# Patient Record
Sex: FEMALE | Race: WHITE | URBAN - METROPOLITAN AREA
[De-identification: names, ages, dates, MRNs, and addresses within clinical notes are randomized per-mention and may not be internally consistent; named-entity substitution may affect disease eponyms.]

---

## 2017-01-01 ENCOUNTER — TRANSFERRED RECORDS (OUTPATIENT)
Dept: HEALTH INFORMATION MANAGEMENT | Facility: CLINIC | Age: 77
End: 2017-01-01

## 2017-01-01 ENCOUNTER — ASSISTED LIVING VISIT (OUTPATIENT)
Dept: GERIATRICS | Facility: CLINIC | Age: 77
End: 2017-01-01
Payer: COMMERCIAL

## 2017-01-01 VITALS
BODY MASS INDEX: 30.12 KG/M2 | TEMPERATURE: 98.5 F | RESPIRATION RATE: 22 BRPM | SYSTOLIC BLOOD PRESSURE: 143 MMHG | HEART RATE: 94 BPM | WEIGHT: 154.2 LBS | DIASTOLIC BLOOD PRESSURE: 69 MMHG

## 2017-01-01 DIAGNOSIS — Z51.81 ENCOUNTER FOR THERAPEUTIC DRUG MONITORING: ICD-10-CM

## 2017-01-01 DIAGNOSIS — Z79.01 LONG TERM CURRENT USE OF ANTICOAGULANT THERAPY: ICD-10-CM

## 2017-01-01 DIAGNOSIS — I48.20 CHRONIC ATRIAL FIBRILLATION (H): Primary | ICD-10-CM

## 2017-01-01 RX ORDER — IPRATROPIUM BROMIDE AND ALBUTEROL SULFATE 2.5; .5 MG/3ML; MG/3ML
1 SOLUTION RESPIRATORY (INHALATION) EVERY 4 HOURS
COMMUNITY

## 2017-03-22 ENCOUNTER — TRANSFERRED RECORDS (OUTPATIENT)
Dept: HEALTH INFORMATION MANAGEMENT | Facility: CLINIC | Age: 77
End: 2017-03-22

## 2017-04-06 ENCOUNTER — ASSISTED LIVING VISIT (OUTPATIENT)
Dept: GERIATRICS | Facility: CLINIC | Age: 77
End: 2017-04-06
Payer: COMMERCIAL

## 2017-04-06 VITALS — WEIGHT: 158.6 LBS | BODY MASS INDEX: 28.55 KG/M2 | RESPIRATION RATE: 22 BRPM | TEMPERATURE: 95.8 F | HEART RATE: 82 BPM

## 2017-04-06 DIAGNOSIS — F33.0 MILD RECURRENT MAJOR DEPRESSION (H): ICD-10-CM

## 2017-04-06 DIAGNOSIS — I10 ESSENTIAL HYPERTENSION: ICD-10-CM

## 2017-04-06 DIAGNOSIS — F41.9 ANXIETY DISORDER, UNSPECIFIED TYPE: ICD-10-CM

## 2017-04-06 DIAGNOSIS — R73.9 HYPERGLYCEMIA: ICD-10-CM

## 2017-04-06 DIAGNOSIS — A04.72 C. DIFFICILE COLITIS: ICD-10-CM

## 2017-04-06 DIAGNOSIS — I50.9 CONGESTIVE HEART FAILURE, UNSPECIFIED CONGESTIVE HEART FAILURE CHRONICITY, UNSPECIFIED CONGESTIVE HEART FAILURE TYPE: ICD-10-CM

## 2017-04-06 DIAGNOSIS — I48.91 ATRIAL FIBRILLATION, UNSPECIFIED TYPE (H): ICD-10-CM

## 2017-04-06 DIAGNOSIS — K21.9 GASTROESOPHAGEAL REFLUX DISEASE, ESOPHAGITIS PRESENCE NOT SPECIFIED: ICD-10-CM

## 2017-04-06 DIAGNOSIS — E53.8 VITAMIN B12 DEFICIENCY: ICD-10-CM

## 2017-04-06 DIAGNOSIS — S22.000S THORACIC COMPRESSION FRACTURE, SEQUELA: ICD-10-CM

## 2017-04-06 DIAGNOSIS — H26.9 CATARACT: ICD-10-CM

## 2017-04-06 DIAGNOSIS — K22.719 BARRETT'S ESOPHAGUS WITH DYSPLASIA: ICD-10-CM

## 2017-04-06 DIAGNOSIS — N18.4 CHRONIC KIDNEY DISEASE, STAGE IV (SEVERE) (H): ICD-10-CM

## 2017-04-06 DIAGNOSIS — J44.9 COPD (CHRONIC OBSTRUCTIVE PULMONARY DISEASE) (H): Primary | ICD-10-CM

## 2017-04-06 DIAGNOSIS — H43.399 FLOATERS, UNSPECIFIED LATERALITY: ICD-10-CM

## 2017-04-06 PROBLEM — G25.3 MYOCLONUS: Status: ACTIVE | Noted: 2017-04-06

## 2017-04-06 PROBLEM — R44.1 VISUAL HALLUCINATIONS: Status: ACTIVE | Noted: 2017-04-06

## 2017-04-06 PROBLEM — J34.89 NASAL VESTIBULITIS: Status: ACTIVE | Noted: 2017-04-06

## 2017-04-06 PROBLEM — R68.2 DRY MOUTH: Status: ACTIVE | Noted: 2017-04-06

## 2017-04-06 RX ORDER — CARBOXYMETHYLCELLULOSE SODIUM 5 MG/ML
1-2 SOLUTION/ DROPS OPHTHALMIC EVERY 4 HOURS PRN
COMMUNITY

## 2017-04-06 RX ORDER — LIDOCAINE 50 MG/G
OINTMENT TOPICAL PRN
COMMUNITY
End: 2017-04-06

## 2017-04-06 RX ORDER — ALBUTEROL SULFATE 90 UG/1
2 AEROSOL, METERED RESPIRATORY (INHALATION) EVERY 6 HOURS PRN
COMMUNITY
End: 2017-08-21

## 2017-04-06 RX ORDER — LOPERAMIDE HCL 2 MG
2 CAPSULE ORAL DAILY
COMMUNITY
End: 2017-05-17

## 2017-04-06 RX ORDER — ALBUTEROL SULFATE 0.83 MG/ML
1 SOLUTION RESPIRATORY (INHALATION) 4 TIMES DAILY
COMMUNITY
End: 2017-05-17

## 2017-04-06 RX ORDER — ARFORMOTEROL TARTRATE 15 UG/2ML
15 SOLUTION RESPIRATORY (INHALATION) 2 TIMES DAILY
COMMUNITY
End: 2017-05-17

## 2017-04-06 RX ORDER — FLUTICASONE PROPIONATE 220 UG/1
2 AEROSOL, METERED RESPIRATORY (INHALATION) 2 TIMES DAILY
COMMUNITY
End: 2017-05-17

## 2017-04-06 RX ORDER — TIOTROPIUM BROMIDE 18 UG/1
18 CAPSULE ORAL; RESPIRATORY (INHALATION) DAILY
COMMUNITY
End: 2017-05-17

## 2017-04-06 RX ORDER — FLUTICASONE PROPIONATE 50 MCG
2 SPRAY, SUSPENSION (ML) NASAL DAILY
COMMUNITY

## 2017-04-06 NOTE — PROGRESS NOTES
[place] [facility]  PRIMARY CARE PROVIDER AND CLINIC:  Julio César [facility] [address]  / [place]*  Chief Complaint   Patient presents with     Establish Care       HPI:    Estefania Brandon is a 76 year old  (1940),admitted to the [facility] on 3/31/17, from [facility].  Admitted to this facility for  rehab, medical management and nursing care.     Current issues are:         COPD (chronic obstructive pulmonary disease) (H)  Congestive heart failure, unspecified congestive heart failure chronicity, unspecified congestive heart failure type (H)  Anxiety disorder, unspecified type  Atrial fibrillation, unspecified type (H)  Essential hypertension  Vitamin B12 deficiency  Gastroesophageal reflux disease, esophagitis presence not specified  Cleveland's esophagus with dysplasia  C. difficile colitis  Chronic kidney disease, stage IV (severe) (H)  Hyperglycemia  Thoracic compression fracture, sequela  Cataract  Floaters, unspecified laterality  Mild recurrent major depression (H)     Patient moved down to [facility] from [facility] in [place], [place], wanted to be closer to children, previously followed by Dr. Lennie Becerril at the [facility] in [place] with last appt on 3/22/17, is O2 dependent, moderate SOB with activity, end-stage COPD, previous smoker, BIPAP with sterile liquid humidifier, using walker to ambulate, Dr. Becerril completed F2F for [employer], SLUMs 21/30, mild cognitive impairment, independent med setup, has [facility] RN/PT/OT services, own INR machine to contact INRMD with results, weighs self daily, depression that may have been exacerbated by death of son to ETOH approximately 11 months ago, in recent history has fallen with Fx of rotator cuff and 2 ribs, overall is a complex patient with chronic condition, full code, physically weak, cognitively limited.     CODE  STATUS/ADVANCE DIRECTIVES DISCUSSION:   CPR/Full code   Patient's living condition: lives in an assisted living facility    ALLERGIES:Bacitracin; Cipro [benzyl alcohol]; Codeine; Neomycin; Plavix [clopidogrel bisulfate]; and Sulfa drugs  PAST MEDICAL HISTORY:  has a past medical history of Abnormal Pap smear; Atrial fibrillation (H) (2008); Carotid artery bruit; COPD (chronic obstructive pulmonary disease) (H) (1992); Essential hypertension, benign (1999); GERD (GASTROESOPHAGEAL REFLUX DISEASE) (11/16/2010); High cholesterol; Hypercoagulable state (H) (1993); Irritable bowel; and Osteoporoses.  PAST SURGICAL HISTORY:  has a past surgical history that includes EARDRUM REVISION; tonsillectomy & adenoidectomy (1950s); and EXCISION SKIN OF NOSE (8-11-10).  FAMILY HISTORY: family history includes Alcohol/Drug in her son; Alzheimer Disease in her brother; C.A.D. in her brother; CANCER in her brother and father; CEREBROVASCULAR DISEASE in her sister; Cancer - colorectal in her father; Cardiovascular in her mother; Connective Tissue Disorder in her daughter and son; Depression in her mother; Genitourinary Problems in her brother; Gynecology in her mother; HEART DISEASE in her brother; Hypertension in her brother, brother, mother, sister, sister, sister, and son; Lipids in her brother, sister, sister, and sister; Psychotic Disorder in her brother; Respiratory in her sister.  SOCIAL HISTORY:  reports that she has quit smoking. Her smoking use included Cigarettes. She has a 70.00 pack-year smoking history. She does not have any smokeless tobacco history on file. She reports that she does not drink alcohol or use illicit drugs.    Post Discharge Medication Reconciliation Status: discharge medications reconciled and changed, per note/orders (see AVS).  Current Outpatient Prescriptions   Medication Sig Dispense Refill     albuterol (2.5 MG/3ML) 0.083% neb solution Take 1 vial by nebulization 4 times daily       ALPRAZOLAM PO Take  0.25 mg by mouth daily       ALPRAZOLAM PO Take 0.125 mg by mouth At Bedtime       arformoterol (BROVANA) 15 MCG/2ML NEBU neb solution Take 15 mcg by nebulization 2 times daily       CALCIUM-VITAMIN D PO Take 1 tablet by mouth daily Dom 600 / Vit d 800       Citalopram Hydrobromide (CELEXA PO) Take 20 mg by mouth daily       Warfarin Sodium (COUMADIN PO) Take by mouth daily Take as directed per INR       CYANOCOBALAMIN PO Take 1,000 mcg by mouth daily       DULoxetine HCl (CYMBALTA PO) Take 40 mg by mouth daily       fluticasone (FLOVENT HFA) 220 MCG/ACT Inhaler Inhale 2 puffs into the lungs 2 times daily       fluticasone (FLONASE) 50 MCG/ACT spray Spray 1 spray into both nostrils daily as needed for rhinitis or allergies       Alum hydroxide-Mag carbonate (GAVISCON EXTRA STRENGTH) 160-105 MG CHEW Take 1 chew tab by mouth every 4 hours as needed       LISINOPRIL PO Take 2.5 mg by mouth Give 3 times per week       loperamide (IMODIUM) 2 MG capsule Take 2 mg by mouth every other day       Acetaminophen (TYLENOL PO) Take 1,000 mg by mouth every 6 hours as needed for mild pain or fever       METOLAZONE PO Take 2.5 mg by mouth Give 1 time weekly as needed for weight gain of 3 lbs or more       METOPROLOL SUCCINATE ER PO Take 12.5 mg by mouth daily        METOPROLOL SUCCINATE ER PO Take 12.5 mg by mouth daily as needed       NITROGLYCERIN SL Place 0.4 mg under the tongue every 5 minutes as needed for chest pain       OMEPRAZOLE PO Take 20 mg by mouth 2 times daily       POTASSIUM CHLORIDE ER PO Take 20 mEq by mouth daily       PREDNISONE PO Take 5 mg by mouth daily       carboxymethylcellulose (REFRESH PLUS) 0.5 % SOLN ophthalmic solution Place 1-2 drops into both eyes every 4 hours as needed for dry eyes       sodium chloride (OCEAN) 0.65 % nasal spray Spray 1 spray into both nostrils as needed for congestion       SIMVASTATIN PO Take 40 mg by mouth At Bedtime       tiotropium (SPIRIVA) 18 MCG capsule Inhale 18 mcg into  the lungs daily       TRAMADOL HCL PO Take 50 mg by mouth 2 times daily as needed for moderate to severe pain       albuterol (PROAIR HFA/PROVENTIL HFA/VENTOLIN HFA) 108 (90 BASE) MCG/ACT Inhaler Inhale 2 puffs into the lungs 4 times daily       VITAMIN D, CHOLECALCIFEROL, PO Take 2,000 Units by mouth daily       FUROSEMIDE 20 MG OR TABS TAKE ONE TABLET BY MOUTH TWICE DAILY 180 5     CLOTRIMAZOLE 10 MG MT LOZG 1 LOZENGE 4 TIMES DAILY     2       ROS:  10 point ROS of systems including Constitutional, Eyes, Respiratory, Cardiovascular, Gastroenterology, Genitourinary, Integumentary, Muscularskeletal, Psychiatric were all negative except for pertinent positives noted in my HPI.    Exam:  Pulse 82  Temp 95.8  F (35.4  C)  Resp 22  Wt 158 lb 9.6 oz (71.9 kg)  BMI 28.55 kg/m2  GENERAL APPEARANCE:  Alert, appears healthy, in slight/moderate distress due to SOB  ENT:  Mouth and posterior oropharynx normal, moist mucous membranes, normal hearing acuity  RESP:  lungs clear to auscultation , diminished breath sounds L lower base, using accessory muscles  CV:  Palpation and auscultation of heart done , regular rate and rhythm, no murmur, rub, or gallop, +2 pedal pulses, peripheral edema scant-1+ in bilateral lower legs  ABDOMEN:  no guarding or rebound, bowel sounds normal  M/S:   Gait and station abnormal weak, requires walker, SOB with any activity  SKIN:  Inspection of skin and subcutaneous tissue baseline  NEURO:   Examination of sensation by touch normal  PSYCH:  oriented to self/place, memory impaired , affect and mood normal, anxious    Lab/Diagnostic data:    None available at thi time.       ASSESSMENT/PLAN:  COPD (chronic obstructive pulmonary disease) (H)  Anxiety disorder, unspecified type  -previous smoker, lived in apartment with smokers present  -since arrival to [facility] improved air flow and decreased SOB  -[facility] PT/OT working with  -has BIPAP with sterile humidifier for  "sleeping  -alprazolam 0.25 Qam and 0.125mg Qpm for anxiety  -continues albuterol neb QID, brovana 15mcg neb BID, flovent HFA 220mcg BID, O2 3L, prednisone 5mg Qd, spiriva 18mcg Qd, ventolin 108mcg QID  -severe COPD, follow closely, infection and aspiration risk is high  -consider addition of alprazolam to TID if anxiousness continues  -check CBC on 4/12/17    Congestive heart failure, unspecified congestive heart failure chronicity, unspecified congestive heart failure type (H)  -taking lasix 20mg BID, recently decreased from 40mg BID due to dehydration  -taking lisinopril 2.5mg 3x/wk, unsure of why dosing is intermittent versus scheduled  -taking metoprolol succinate 12.5mg Qam WITH 12.5mg PRN for \"tremors/palpitations/myoclonic jerking\", consider this odd dosing also  -has nitro PRN, used only 1x in past 30 days  -continues K supplement 20mEq daily  -simvistatin 40mg Qhs  -above medications may require additional review to promote efficacy  -check BMP on 4/12/17    Mild recurrent major depression  -current regimen is historical: citalopram 20mg Qd and duloxetine 40mg Qpm  -PHQ-2 scored 0 at last Md appt  -unsure why on SSRI and SNRI concurrently  -continue until further notice, consider D/C of one in favor to increase other    Atrial fibrillation, unspecified type (H)  Essential hypertension  -long-term warfarin user  -has Accucheck INR machine in apt, checks Qweek and calls into clinic for dosing  -patient to contact [facility] staff with result Qwednesday for dosing instructions via [employer]  -INR today 1.8, was taking 3mg MWF, 4mg ROW  -start warfarin 4mg Qd  -recheck INR on Wed 4/12 with accucheck in home    Vitamin B12 deficiency  Anemia  -current historical regimen is Ca/VitaD 600/800 Qd, VItaB 1000mcg Qd  -continue at this juncture, check BMP/CBC and Beba D on 4/12    Gastroesophageal reflux disease, esophagitis presence not specified  Cleveland's esophagus with dysplasia  C. difficile colitis  -currently denies gut " "discomfort  -taking omeprazole 20mg BID  -consider reduction to Qd at next visit/med review    Chronic kidney disease, stage IV (severe) (H)  -historical Dx  -checking BMP 4/12    Hyperglycemia  -historical, no lab results available  -checking A1C on 4/12    Thoracic compression fracture, sequela  -has had previous falls, last in 2016 with compression Fx of vertebrae  -has acetaminophen PRN, tramadol 50mg BID PRN, and gabapentin 300mg BID  -D/C lidocaine patch, not using  -currently stable, states taking tramadol \"a few times\" monthly    Cataract  Floaters, unspecified laterality  -historical Dx  -continues refresh tears for dry eye only at this time  -consider f/u with ophthalmologist in future as indicated      Information reviewed:  Medications, vital signs, orders, nursing notes, problem list, hospital information. Facility care plan reviewed.   Total time spent with patient visit was 60 including patient visit, review of past records and caregivers. Greater than 50% of total time spent with counseling and coordinating care.    Electronically signed by:  IAN Maloney CNP              "

## 2017-04-11 ENCOUNTER — TRANSFERRED RECORDS (OUTPATIENT)
Dept: HEALTH INFORMATION MANAGEMENT | Facility: CLINIC | Age: 77
End: 2017-04-11

## 2017-04-12 ENCOUNTER — TRANSFERRED RECORDS (OUTPATIENT)
Dept: HEALTH INFORMATION MANAGEMENT | Facility: CLINIC | Age: 77
End: 2017-04-12

## 2017-04-12 ENCOUNTER — RECORDS - HEALTHEAST (OUTPATIENT)
Dept: LAB | Facility: CLINIC | Age: 77
End: 2017-04-12

## 2017-04-12 LAB
ANION GAP SERPL CALCULATED.3IONS-SCNC: 10 MMOL/L (ref 5–18)
BUN SERPL-MCNC: 20 MG/DL (ref 8–28)
CALCIUM SERPL-MCNC: 9.1 MG/DL (ref 8.5–10.5)
CHLORIDE SERPLBLD-SCNC: 98 MMOL/L (ref 98–107)
CO2 SERPL-SCNC: 34 MMOL/L (ref 22–31)
CREAT SERPL-MCNC: 0.86 MG/DL (ref 0.6–1.1)
DIFFERENTIAL: ABNORMAL
ERYTHROCYTE [DISTWIDTH] IN BLOOD BY AUTOMATED COUNT: 12.6 % (ref 11–14.5)
GFR SERPL CREATININE-BSD FRML MDRD: >60 ML/MIN/1.73M2
GLUCOSE SERPL-MCNC: 88 MG/DL (ref 70–125)
HCT VFR BLD AUTO: 38 % (ref 35–47)
HEMOGLOBIN: 12.2 G/DL (ref 12–16)
INR PPP: 2.14 (ref 0.9–1.1)
MCH RBC QN AUTO: 32.4 PG (ref 27–34)
MCHC RBC AUTO-ENTMCNC: 32.1 G/DL (ref 32–36)
MCV RBC AUTO: 101 FL (ref 80–100)
PLATELET # BLD AUTO: 165 THOU/UL (ref 140–440)
POTASSIUM SERPL-SCNC: 4.1 MMOL/L (ref 3.5–5)
RBC # BLD AUTO: 3.77 MILL/UL (ref 3.8–5.4)
SODIUM SERPL-SCNC: 142 MMOL/L (ref 136–145)
TSH SERPL-ACNC: 1.05 UIU/ML (ref 0.3–5)
WBC # BLD AUTO: 6 THOU/UL (ref 4–11)

## 2017-04-13 ENCOUNTER — NURSING HOME VISIT (OUTPATIENT)
Dept: GERIATRICS | Facility: CLINIC | Age: 77
End: 2017-04-13
Payer: COMMERCIAL

## 2017-04-13 VITALS
TEMPERATURE: 98.8 F | HEART RATE: 82 BPM | WEIGHT: 158 LBS | DIASTOLIC BLOOD PRESSURE: 86 MMHG | SYSTOLIC BLOOD PRESSURE: 122 MMHG | RESPIRATION RATE: 22 BRPM | BODY MASS INDEX: 28.44 KG/M2

## 2017-04-13 DIAGNOSIS — J20.9 ACUTE BRONCHITIS, UNSPECIFIED ORGANISM: ICD-10-CM

## 2017-04-13 DIAGNOSIS — S22.000S THORACIC COMPRESSION FRACTURE, SEQUELA: ICD-10-CM

## 2017-04-13 DIAGNOSIS — S22.32XA CLOSED FRACTURE OF RIB OF LEFT SIDE, INITIAL ENCOUNTER: ICD-10-CM

## 2017-04-13 DIAGNOSIS — Z51.81 ENCOUNTER FOR THERAPEUTIC DRUG MONITORING: ICD-10-CM

## 2017-04-13 DIAGNOSIS — I48.20 CHRONIC ATRIAL FIBRILLATION (H): ICD-10-CM

## 2017-04-13 DIAGNOSIS — N18.4 CHRONIC KIDNEY DISEASE, STAGE IV (SEVERE) (H): Primary | ICD-10-CM

## 2017-04-13 DIAGNOSIS — Z79.01 LONG TERM (CURRENT) USE OF ANTICOAGULANTS: ICD-10-CM

## 2017-04-13 DIAGNOSIS — J42 CHRONIC BRONCHITIS, UNSPECIFIED CHRONIC BRONCHITIS TYPE (H): ICD-10-CM

## 2017-04-13 PROBLEM — S22.39XA FRACTURE OF RIB: Status: ACTIVE | Noted: 2017-04-13

## 2017-04-13 LAB — HBA1C MFR BLD: 6 % (ref 4.2–6.1)

## 2017-04-13 PROCEDURE — 99207 ZZC CDG-CORRECTLY CODED, REVIEWED AND AGREE: CPT | Performed by: NURSE PRACTITIONER

## 2017-04-13 RX ORDER — TRAMADOL HYDROCHLORIDE 50 MG/1
50 TABLET ORAL 3 TIMES DAILY PRN
Qty: 28 TABLET | Refills: 5
Start: 2017-04-13 | End: 2017-04-20

## 2017-04-13 RX ORDER — AZITHROMYCIN 250 MG/1
TABLET, FILM COATED ORAL
Qty: 6 TABLET | Refills: 0
Start: 2017-04-13 | End: 2017-04-20

## 2017-04-13 NOTE — PROGRESS NOTES
[facility] [facility]    Chief Complaint   Patient presents with     Nursing Home Acute       HPI:    Estefania Brandon is a 76 year old  (1940), who is being seen today for an episodic care visit at [facility]. Today's concern is:       Acute bronchitis, unspecified organism  Fracture of rib  Thoracic compression fracture, sequela  COPD (chronic obstructive pulmonary disease) (H)  Chronic kidney disease, stage IV (severe) (H)  Chronic atrial fibrillation (H)  Encounter for therapeutic drug monitoring  Long term (current) use of anticoagulants     Patient seen today for INR recheck, medication follow up, lab review, and new-onset R flank pain, Labs 4/12/17 Na 142, K 4.1, Ca 9.1, creat 0.86, RBC 12.2, Plat 165, VitaD 72.7 (30-80), A1C 6.0, INR 2.14, TSH 1.05, CXR on 4/11/17 impression: mild L lower lung bronchitis, mild cardiomegaly, pulmonary granulomatous disease, pulmonary emphysema, pulmonary arterial hypertension, acute Fx of posterior L seventh rib with 1mm cortical offset, diffuse osteopenia, age-in determinant compression fractures of T6-9, positive for back and L flank pain, SOB with minimal activity, overall stable albeit chronic/complex conditions present.    ALLERGIES: Bacitracin; Cipro [benzyl alcohol]; Codeine; Neomycin; Plavix [clopidogrel bisulfate]; and Sulfa drugs  Past Medical, Surgical, Family and Social History reviewed and updated in [facility].    Current Outpatient Prescriptions   Medication Sig Dispense Refill     azithromycin (ZITHROMAX) 250 MG tablet Two tablets first day, then one tablet daily for four days. 6 tablet 0     traMADol (ULTRAM) 50 MG tablet Take 1 tablet (50 mg) by mouth 3 times daily as needed 28 tablet 5     albuterol (2.5 MG/3ML) 0.083% neb solution Take 1 vial by nebulization 4 times daily       ALPRAZOLAM PO Take 0.25 mg by mouth daily       ALPRAZOLAM PO Take 0.125 mg by mouth At Bedtime       arformoterol (BROVANA) 15 MCG/2ML NEBU neb solution Take 15  mcg by nebulization 2 times daily       Citalopram Hydrobromide (CELEXA PO) Take 20 mg by mouth daily       Warfarin Sodium (COUMADIN PO) Take by mouth daily Take as directed per INR       CYANOCOBALAMIN PO Take 1,000 mcg by mouth daily       DULoxetine HCl (CYMBALTA PO) Take 40 mg by mouth daily       fluticasone (FLOVENT HFA) 220 MCG/ACT Inhaler Inhale 2 puffs into the lungs 2 times daily       fluticasone (FLONASE) 50 MCG/ACT spray Spray 1 spray into both nostrils daily as needed for rhinitis or allergies       Alum hydroxide-Mag carbonate (GAVISCON EXTRA STRENGTH) 160-105 MG CHEW Take 1 chew tab by mouth every 4 hours as needed       LISINOPRIL PO Take 2.5 mg by mouth Give 3 times per week       loperamide (IMODIUM) 2 MG capsule Take 2 mg by mouth every other day       Acetaminophen (TYLENOL PO) Take 1,000 mg by mouth every 6 hours as needed for mild pain or fever       METOLAZONE PO Take 2.5 mg by mouth Give 1 time weekly as needed for weight gain of 3 lbs or more       METOPROLOL SUCCINATE ER PO Take 12.5 mg by mouth daily        METOPROLOL SUCCINATE ER PO Take 12.5 mg by mouth daily as needed       NITROGLYCERIN SL Place 0.4 mg under the tongue every 5 minutes as needed for chest pain       OMEPRAZOLE PO Take 20 mg by mouth 2 times daily       POTASSIUM CHLORIDE ER PO Take 20 mEq by mouth daily       PREDNISONE PO Take 5 mg by mouth daily       carboxymethylcellulose (REFRESH PLUS) 0.5 % SOLN ophthalmic solution Place 1-2 drops into both eyes every 4 hours as needed for dry eyes       sodium chloride (OCEAN) 0.65 % nasal spray Spray 1 spray into both nostrils as needed for congestion       SIMVASTATIN PO Take 40 mg by mouth At Bedtime       tiotropium (SPIRIVA) 18 MCG capsule Inhale 18 mcg into the lungs daily       albuterol (PROAIR HFA/PROVENTIL HFA/VENTOLIN HFA) 108 (90 BASE) MCG/ACT Inhaler Inhale 2 puffs into the lungs 4 times daily       FUROSEMIDE 20 MG OR TABS TAKE ONE TABLET BY MOUTH TWICE DAILY 180  5     CLOTRIMAZOLE 10 MG MT LOZG 1 LOZENGE 4 TIMES DAILY     2     Calcium Carb-Cholecalciferol (CALCIUM-VITAMIN D) 600-400 MG-UNIT TABS Take 1 tablet by mouth 2 times daily Dom 600 / Vit d 800  11     Medications reviewed:  Medications reconciled to facility chart and changes were made to reflect current medications as identified as above med list. Below are the changes that were made:   Medications stopped since last EPIC medication reconciliation:   There are no discontinued medications.    Medications started since last [facility] medication reconciliation:  No orders of the defined types were placed in this encounter.        REVIEW OF SYSTEMS:  10 point ROS of systems including Constitutional, Eyes, Respiratory, Cardiovascular, Gastroenterology, Genitourinary, Integumentary, Muscularskeletal, Psychiatric were all negative except for pertinent positives noted in my HPI.    Physical Exam:  /86  Pulse 82  Temp 98.8  F (37.1  C)  Resp 22  Wt 158 lb (71.7 kg)  BMI 28.44 kg/m2  GENERAL APPEARANCE: Alert, appears healthy, slightly anxious with L flank pain/SOB  ENT: Mouth and posterior oropharynx normal, moist mucous membranes  RESP: lungs clear to auscultation, diminished breath sounds bilateral bases  CV: Palpation and auscultation of heart done, regular rate and rhythm, edema scant in bilateral lower legs  ABDOMEN: no guarding or rebound, bowel sounds normal  M/S: Gait and station abnormal weak, requires walker, SOB with activity   SKIN: Inspection of skin and subcutaneous tissue baseline  NEURO: Examination of sensation by touch normal  PSYCH: oriented to self/place, memory impaired, affect and mood WNL, anxious    Recent Labs:   Lab Results   Component Value Date    WBC 6.0 04/12/2017    HGB 12.2 04/12/2017    HCT 38.0 04/12/2017     04/12/2017     04/12/2017    POTASSIUM 4.1 04/12/2017    CHLORIDE 98 04/12/2017    CO2 34 (A) 04/12/2017    BUN 20 04/12/2017    CR 0.86 04/12/2017    GLC 88  04/12/2017    SED 19 05/29/2007    AST 30 11/12/2010    ALT 38 11/12/2010    INR 2.14 (A) 04/12/2017       Assessment/Plan:  Acute bronchitis, unspecified organism  COPD (chronic obstructive pulmonary disease) (H)  -CXR on 4/11 probable infectious bronchiolitis  -patient afebrile, status WNL, anxious over potential Dx  -start Zpak stat, states having taken previously for similar issue  -continues O2 full time and pulmonary medications as prescribed    Fracture of rib  Thoracic compression fracture, sequela  -CXR 4/11 positive for L seventh rib Fx with 1mm cortical offset  -will increase tramadol to 50mg TID PRN for pain control  -patient understands to take tylenol with tramadol  -XR on 4/11 also positive for old compression fractures of T6-9  -consider decrease of tramadol back to BID after rib Fx healed      Chronic kidney disease, stage IV (severe) (H)  -historical Dx, may have been during hospitalization  -labs 4/12/17 creat/GFR were 0.86/>60  -no additional concerns at this time  -follow BMP's Q3-6 months or earlier if indicated    Chronic atrial fibrillation (H)  Encounter for therapeutic drug monitoring  Long term (current) use of anticoagulants  -previously on warfarin 3mg MWF and 4mg ROW  -INR on 4/6/17 was 1.8; take 4mg Qd  -INR on 4/12/17 was 2.14; take 4mg Qd  -recheck INR on Wed 4/26    Additional orders:  -VitaD on 4/12/17 was 72.7 (range 30-80)  -D/C VitaD supplement 2000iu/day  -increased Ca/VitaD 600/800 to BID    Electronically signed by  IAN Maloney CNP

## 2017-04-16 ENCOUNTER — TELEPHONE (OUTPATIENT)
Dept: GERIATRICS | Facility: CLINIC | Age: 77
End: 2017-04-16

## 2017-04-16 NOTE — TELEPHONE ENCOUNTER
Nursing report patient reports hallucinations, started last night and continued on today  Patient is aware of the hallucinations, has a Hx of anxiety, no new medications, she is being Tx for acute bronchitis as this time  Vitals are stable at this time  Orders:  Continue to monitor at this time, update NP on Monday

## 2017-04-20 ENCOUNTER — NURSING HOME VISIT (OUTPATIENT)
Dept: GERIATRICS | Facility: CLINIC | Age: 77
End: 2017-04-20
Payer: COMMERCIAL

## 2017-04-20 VITALS
WEIGHT: 158 LBS | TEMPERATURE: 98 F | RESPIRATION RATE: 22 BRPM | BODY MASS INDEX: 28.44 KG/M2 | SYSTOLIC BLOOD PRESSURE: 128 MMHG | DIASTOLIC BLOOD PRESSURE: 78 MMHG | OXYGEN SATURATION: 98 % | HEART RATE: 80 BPM

## 2017-04-20 DIAGNOSIS — F41.9 ANXIETY DISORDER, UNSPECIFIED TYPE: ICD-10-CM

## 2017-04-20 DIAGNOSIS — R44.3 HALLUCINATIONS: ICD-10-CM

## 2017-04-20 DIAGNOSIS — Z79.01 LONG TERM (CURRENT) USE OF ANTICOAGULANTS: ICD-10-CM

## 2017-04-20 DIAGNOSIS — K21.9 GASTROESOPHAGEAL REFLUX DISEASE WITHOUT ESOPHAGITIS: ICD-10-CM

## 2017-04-20 DIAGNOSIS — I48.20 CHRONIC ATRIAL FIBRILLATION (H): ICD-10-CM

## 2017-04-20 DIAGNOSIS — N18.4 CHRONIC KIDNEY DISEASE, STAGE IV (SEVERE) (H): ICD-10-CM

## 2017-04-20 DIAGNOSIS — Z51.81 ENCOUNTER FOR THERAPEUTIC DRUG MONITORING: ICD-10-CM

## 2017-04-20 DIAGNOSIS — I50.9 CONGESTIVE HEART FAILURE, UNSPECIFIED CONGESTIVE HEART FAILURE CHRONICITY, UNSPECIFIED CONGESTIVE HEART FAILURE TYPE: ICD-10-CM

## 2017-04-20 DIAGNOSIS — S22.32XA CLOSED FRACTURE OF RIB OF LEFT SIDE, INITIAL ENCOUNTER: Primary | ICD-10-CM

## 2017-04-20 DIAGNOSIS — I10 ESSENTIAL HYPERTENSION: ICD-10-CM

## 2017-04-20 PROCEDURE — 99207 ZZC CDG-CORRECTLY CODED, REVIEWED AND AGREE: CPT | Performed by: NURSE PRACTITIONER

## 2017-04-20 RX ORDER — FUROSEMIDE 20 MG
20 TABLET ORAL DAILY
Qty: 180 TABLET | Refills: 11
Start: 2017-04-20 | End: 2017-05-17

## 2017-04-20 NOTE — PROGRESS NOTES
"[facility]    Chief Complaint   Patient presents with     RECHECK       HPI:    Estefania Brandon is a 76 year old  (1940), who is being seen today for an episodic care visit at [facility] . Today's concern is:       Closed fracture of rib of left side, initial encounter  Hallucinations  Chronic kidney disease, stage IV (severe) (H)  Chronic atrial fibrillation (H)  Long term (current) use of anticoagulants  Encounter for therapeutic drug monitoring  Congestive heart failure, unspecified congestive heart failure chronicity, unspecified congestive heart failure type (H)  Anxiety disorder, unspecified type  Essential hypertension  Gastroesophageal reflux disease without esophagitis     Patient has a complex Hx/Dx, admitted to [facility] on 4/6/17, attempts of patient to be independent have proven to be difficult, found on 4/16 with hallucinations and medication box emptied with 2 days left in 7-day box, INR on 4/19 was 4.5, medications scattered on counter and room, considering probable mix of dementia, anxiety, and stress from recent transfer from home/apartment in [facility], today seen lying in bed, wants to rest, no acute concerns, slightly jittery/picking at skin, although denies hallucinations dose state \"I see 2 of you\", did eat this am and has partial water bottle at bedside, moderately confused but seems like self, was seen by PT after my visit and therapy stated slight weakness and declined therapy, no s/s rib pain as was case back on 4/13, of note facility recently took over medication administration and all meds are now locked up, considering non-compliance with taking medications from med box as setup, dementia, overall decline in both mental and cognitive status.     ALLERGIES: Bacitracin; Cipro [benzyl alcohol]; Codeine; Neomycin; Plavix [clopidogrel bisulfate]; and Sulfa drugs  Past Medical, Surgical, Family and Social History reviewed and updated in EPIC.    Current " Outpatient Prescriptions   Medication Sig Dispense Refill     TRAMADOL HCL PO Take 25 mg by mouth 3 times daily       furosemide (LASIX) 20 MG tablet Take 1 tablet (20 mg) by mouth daily 180 tablet 11     Alum hydroxide-Mag carbonate (GAVISCON EXTRA STRENGTH) 160-105 MG CHEW Take 1 chew tab by mouth 3 times daily (before meals) 240 tablet 11     ALPRAZOLAM PO Take 0.25 mg by mouth 2 times daily        DULoxetine HCl (CYMBALTA PO) Take 20 mg by mouth daily        SIMVASTATIN PO Take 20 mg by mouth At Bedtime        Calcium Carb-Cholecalciferol (CALCIUM-VITAMIN D) 600-400 MG-UNIT TABS Take 1 tablet by mouth 2 times daily Dom 600 / Vit d 800  11     albuterol (2.5 MG/3ML) 0.083% neb solution Take 1 vial by nebulization 4 times daily       arformoterol (BROVANA) 15 MCG/2ML NEBU neb solution Take 15 mcg by nebulization 2 times daily       Citalopram Hydrobromide (CELEXA PO) Take 20 mg by mouth daily       Warfarin Sodium (COUMADIN PO) Take by mouth daily Take as directed per INR       CYANOCOBALAMIN PO Take 1,000 mcg by mouth daily       fluticasone (FLOVENT HFA) 220 MCG/ACT Inhaler Inhale 2 puffs into the lungs 2 times daily       fluticasone (FLONASE) 50 MCG/ACT spray Spray 1 spray into both nostrils daily as needed for rhinitis or allergies       Alum hydroxide-Mag carbonate (GAVISCON EXTRA STRENGTH) 160-105 MG CHEW Take 1 chew tab by mouth every 4 hours as needed       LISINOPRIL PO Take 2.5 mg by mouth Give 3 times per week       loperamide (IMODIUM) 2 MG capsule Take 2 mg by mouth every other day       Acetaminophen (TYLENOL PO) Take 1,000 mg by mouth every 6 hours as needed for mild pain or fever       METOLAZONE PO Take 2.5 mg by mouth Give 1 time weekly as needed for weight gain of 3 lbs or more       METOPROLOL SUCCINATE ER PO Take 12.5 mg by mouth daily        METOPROLOL SUCCINATE ER PO Take 12.5 mg by mouth daily as needed       NITROGLYCERIN SL Place 0.4 mg under the tongue every 5 minutes as needed for  chest pain       OMEPRAZOLE PO Take 20 mg by mouth 2 times daily       POTASSIUM CHLORIDE ER PO Take 20 mEq by mouth daily       PREDNISONE PO Take 5 mg by mouth daily       carboxymethylcellulose (REFRESH PLUS) 0.5 % SOLN ophthalmic solution Place 1-2 drops into both eyes every 4 hours as needed for dry eyes       sodium chloride (OCEAN) 0.65 % nasal spray Spray 1 spray into both nostrils as needed for congestion       tiotropium (SPIRIVA) 18 MCG capsule Inhale 18 mcg into the lungs daily       albuterol (PROAIR HFA/PROVENTIL HFA/VENTOLIN HFA) 108 (90 BASE) MCG/ACT Inhaler Inhale 2 puffs into the lungs 4 times daily       CLOTRIMAZOLE 10 MG MT LOZG 1 LOZENGE 4 TIMES DAILY     2     [DISCONTINUED] FUROSEMIDE 20 MG OR TABS TAKE ONE TABLET BY MOUTH TWICE DAILY 180 5     Medications reviewed:  Medications reconciled to facility chart and changes were made to reflect current medications as identified as above med list. Below are the changes that were made:   Medications stopped since last EPIC medication reconciliation:   There are no discontinued medications.    Medications started since last [facility] medication reconciliation:  No orders of the defined types were placed in this encounter.        REVIEW OF SYSTEMS:  4 point ROS including Respiratory, CV, GI and , other than that noted in the HPI,  is negative    Physical Exam:  /78  Pulse 80  Temp 98  F (36.7  C)  Resp 22  Wt 158 lb (71.7 kg)  SpO2 98%  BMI 28.44 kg/m2  GENERAL APPEARANCE:  Alert, in no distress, anxious  ENT:  Mouth and posterior oropharynx normal, moist mucous membranes  RESP:  lungs clear to auscultation , no respiratory distress  CV:  Palpation and auscultation of heart done , regular rate and rhythm, no murmur, rub, or gallop, peripheral edema scant-1+ in bilateral lower legs  ABDOMEN:  no guarding or rebound, bowel sounds normal  M/S:   Gait and station abnormal requires walker, weak  SKIN:  Inspection of skin and subcutaneous  tissue baseline  NEURO:   Examination of sensation by touch normal  PSYCH:  oriented to self, insight and judgement impaired, memory impaired     Recent Labs:  Lab Results   Component Value Date    WBC 6.0 04/12/2017    HGB 12.2 04/12/2017    HCT 38.0 04/12/2017     04/12/2017     04/12/2017    POTASSIUM 4.1 04/12/2017    CHLORIDE 98 04/12/2017    CO2 34 (A) 04/12/2017    BUN 20 04/12/2017    CR 0.86 04/12/2017    GLC 88 04/12/2017    SED 19 05/29/2007    AST 30 11/12/2010    ALT 38 11/12/2010    INR 2.14 (A) 04/12/2017       Assessment/Plan:  Closed fracture of rib of left side, initial encounter  -CXR on 4/11 for rib Fx L flank  -decrease tramadol from 50 to 25mg TID scheduled  -continue gabapentin 300mg BID    Hallucinations  -considering onset due to non-compliance with medication regimen/med box setup, had taken many tabs not scheduled, unsure of which ones  -facility took over med administration on 4/19  -will follow closely with weights and VS daily x7d  -decrease alprazolam to 0.125mg BID  -decrease duloxetine from 40 to 20mg x7d then D/C; duplicate therapy with citalopram  -[facility] homecare to have mental health RN assess/treat for cognitive issues      Chronic kidney disease, stage IV (severe) (H)  -creat on 4/12/17 was 0.86  -consider BMP in 3-6 months or earlier if indicated    Chronic atrial fibrillation (H)  Long term (current) use of anticoagulants  Encounter for therapeutic drug monitoring  -INR on 4/19 was 4.5; hold warfarin  -recheck INR on Fri 4/21  -unsure if will have facility staff assist with home INR accucheck machine or change to lab draws  -of note, was previously taking mix of 3-4mg Qd      Congestive heart failure, unspecified congestive heart failure chronicity, unspecified congestive heart failure type (H)  Essential hypertension  -taking lasix 20mg BID, recently decreased from 40mg BID due to dehydration, will now decrease to 20mg Qd due to mild dehydration  -taking lisinopril  "2.5mg 3x/wk, unsure of why dosing is intermittent versus scheduled, consider Qd or DC  -taking metoprolol succinate 12.5mg Qam WITH 12.5mg PRN for \"tremors/palpitations/myoclonic jerking\", continue at this juncture  -continue metolazone PRN for weight gain 3# although considering D/C as weight may vary due to clothing/time/diet  -has nitro PRN, has used in past week for \"heart racing\", will have locked up now  -continues K supplement 20mEq daily  -simvistatin reduced to 20mg Qhs    Anxiety disorder, unspecified type  -previously taking alprazolam 0.25mg Qam, 0.125mg Qpm  -will change to 0.125mg BID due to cognitive issues and mental instability  -see above      Gastroesophageal reflux disease without esophagitis  -will change gaviscon to TID  -continue omeprazole BID  -consider weaning omeprazole as indicated    Orders:  1. Decrease simvastatin to 20 mg po qd  2. Hold Coumadin and recheck INR on 4/21/17  3. BP and weights check qd x 7 days and results to NP  4. Decrease tramadol to 25 mg po TID scheduled for rib pain  5. Change aprazolam to 0.25 mg po BID for anxiety  6. Change gaviscon to 1 tab po TID for GERD  7. Decrease duloxetine to 20 mg po qd x 7 days then discontinue  8. Decrease lasix to 20mg Qd  9.  home care OK for mental health RN to evaluate for depression and hallucinations    Above plan intended to reduce medication burden, improve overall cognitive and physical status, provide a constant for medication administration, and allow to be followed with improved ability to titrate medications and promote a healthy status.     Electronically signed by  IAN Maloney CNP                  "

## 2017-04-26 NOTE — PROGRESS NOTES
[facility]    Chief Complaint   Patient presents with     Mobility Issues     INR Followup       HPI:    Estefania Brandon is a 76 year old  (1940), who is being seen today for an episodic care visit at [facility] .     Today's concern is:     Acute low back pain without sciatica, unspecified back pain laterality  Thoracic compression fracture, sequela  Generalized muscle weakness  Personal history of fall  SOB (shortness of breath)  Impaired functional mobility, balance, gait, and endurance  Congestive heart failure, unspecified congestive heart failure chronicity, unspecified congestive heart failure type (H)  Chronic atrial fibrillation (H)  Encounter for therapeutic drug monitoring  Long term (current) use of anticoagulants     Patient presents today with above Dx, is weak, has a high level of limitations regarding mobility, unable to ambulate on own or use manual wheelchair due to severe SOB with associated CHF, on oxygen, numerous chronic conditions and comorbidities over the past year that are being treated medically albeit moderate efficacy, attempts at therapies and medication control have proven of limited efficacy, has walker and manual WC but unable to use effectively, impaired strength, ROM, balance, ability to transfer, mobility issues, unable to ambulate at all without severe SOB, multiple falls with most recent on 4/13/17 with rib Fx+, back pain positive with tramadol and gabapentin for relief, of note INR today 2.28, long-term warfarin user, overall status is weak, debilitated, moderate physical decline.     ALLERGIES: Bacitracin; Cipro [benzyl alcohol]; Codeine; Neomycin; Plavix [clopidogrel bisulfate]; and Sulfa drugs  Past Medical, Surgical, Family and Social History reviewed and updated in [facility].    Current Outpatient Prescriptions   Medication Sig Dispense Refill     Calcium Carb-Cholecalciferol (CALCIUM CARBONATE-VITAMIN D3 PO) Take 1 tablet by mouth 2 times  daily Dom 600 - Vit D 800       GABAPENTIN PO Take 300 mg by mouth 2 times daily       TRAMADOL HCL PO Take 25 mg by mouth 3 times daily as needed        furosemide (LASIX) 20 MG tablet Take 1 tablet (20 mg) by mouth daily 180 tablet 11     Alum hydroxide-Mag carbonate (GAVISCON EXTRA STRENGTH) 160-105 MG CHEW Take 1 chew tab by mouth 3 times daily (before meals) 240 tablet 11     albuterol (2.5 MG/3ML) 0.083% neb solution Take 1 vial by nebulization 4 times daily       ALPRAZOLAM PO Take 0.25 mg by mouth 2 times daily        arformoterol (BROVANA) 15 MCG/2ML NEBU neb solution Take 15 mcg by nebulization 2 times daily       Citalopram Hydrobromide (CELEXA PO) Take 20 mg by mouth daily       Warfarin Sodium (COUMADIN PO) Take by mouth daily Take as directed per INR       CYANOCOBALAMIN PO Take 1,000 mcg by mouth daily       DULoxetine HCl (CYMBALTA PO) Take 20 mg by mouth daily        fluticasone (FLOVENT HFA) 220 MCG/ACT Inhaler Inhale 2 puffs into the lungs 2 times daily       fluticasone (FLONASE) 50 MCG/ACT spray Spray 1 spray into both nostrils daily as needed for rhinitis or allergies       LISINOPRIL PO Take 2.5 mg by mouth Give 3 times per week       loperamide (IMODIUM) 2 MG capsule Take 2 mg by mouth daily        Acetaminophen (TYLENOL PO) Take 1,000 mg by mouth every 6 hours as needed for mild pain or fever       METOLAZONE PO Take 2.5 mg by mouth Give 1 time weekly as needed for weight gain of 3 lbs or more       METOPROLOL SUCCINATE ER PO Take 12.5 mg by mouth daily        METOPROLOL SUCCINATE ER PO Take 12.5 mg by mouth daily as needed       NITROGLYCERIN SL Place 0.4 mg under the tongue every 5 minutes as needed for chest pain       OMEPRAZOLE PO Take 20 mg by mouth 2 times daily       POTASSIUM CHLORIDE ER PO Take 20 mEq by mouth daily       PREDNISONE PO Take 5 mg by mouth daily       carboxymethylcellulose (REFRESH PLUS) 0.5 % SOLN ophthalmic solution Place 1-2 drops into both eyes every 4 hours as  needed for dry eyes       sodium chloride (OCEAN) 0.65 % nasal spray Spray 1 spray into both nostrils as needed for congestion       SIMVASTATIN PO Take 20 mg by mouth At Bedtime        tiotropium (SPIRIVA) 18 MCG capsule Inhale 18 mcg into the lungs daily       albuterol (PROAIR HFA/PROVENTIL HFA/VENTOLIN HFA) 108 (90 BASE) MCG/ACT Inhaler Inhale 2 puffs into the lungs 4 times daily       CLOTRIMAZOLE 10 MG MT LOZG 1 LOZENGE 4 TIMES DAILY     2     Medications reconciled to facility chart and changes were made to reflect current medications as identified as above med list. Below are the changes that were made:   Medications stopped since last EPIC medication reconciliation:   Medications Discontinued During This Encounter   Medication Reason     Calcium Carb-Cholecalciferol (CALCIUM-VITAMIN D) 600-400 MG-UNIT TABS Dose adjustment     Alum hydroxide-Mag carbonate (GAVISCON EXTRA STRENGTH) 160-105 MG CHEW Medication Reconciliation Clean Up       Medications started since last [facility] medication reconciliation:  Orders Placed This Encounter   Medications     Calcium Carb-Cholecalciferol (CALCIUM CARBONATE-VITAMIN D3 PO)     Sig: Take 1 tablet by mouth 2 times daily Dom 600 - Vit D 800     GABAPENTIN PO     Sig: Take 300 mg by mouth 2 times daily       REVIEW OF SYSTEMS:  10 point ROS of systems including Constitutional, Eyes, Respiratory, Cardiovascular, Gastroenterology, Genitourinary, Integumentary, Muscularskeletal, Psychiatric were all negative except for pertinent positives noted in my HPI.    Physical Exam:  /89  Pulse 81  Temp 97.9  F (36.6  C)  Resp 17  Wt 154 lb (69.9 kg)  BMI 27.72 kg/m2  GENERAL APPEARANCE:  Alert, oriented, cooperative  ENT:  Mouth and posterior oropharynx normal, moist mucous membranes, normal hearing acuity  RESP:  lungs clear to auscultation , no respiratory distress, diminished breath sounds bilateral bases  CV:  Palpation and auscultation of heart done , peripheral  edema 1+ in bilateral lower legs, rate-normal  ABDOMEN:  no guarding or rebound, bowel sounds normal  M/S:   Gait and station abnormal requires extensive assist, weak, limited by pain/SOB  SKIN:  Inspection of skin and subcutaneous tissue baseline  NEURO:   Examination of sensation by touch normal  PSYCH:  oriented to self/place, memory impaired , affect and mood normal    Recent Labs:     CBC RESULTS:   Recent Labs   Lab Test 04/12/17  11/12/10   1053   09/22/09   1326   WBC  6.0   --    --   6.7   RBC  3.77*   --    --   4.09   HGB  12.2  12.6   < >  12.6   HCT  38.0   --    --   36.6   MCV  101*   --    --   90   MCH  32.4   --    --   30.8   MCHC  32.1   --    --   34.3   RDW  12.6   --    --   13.0   PLT  165   --    --   144*    < > = values in this interval not displayed.       Last Basic Metabolic Panel:  Recent Labs   Lab Test 04/12/17  11/17/10   1319   10/26/10   1314  05/28/10   1343   NA  142   --    --   139  144   POTASSIUM  4.1   --    --   4.6  4.3   CHLORIDE  98   --    --   97  103   JUANITA  9.1   --    --    --   9.7   CO2  34*   --    --   33*  33*   BUN  20   --    --   17  18   CR  0.86   --    --   1.04  1.01   GLC  88  111*   < >   --   145*    < > = values in this interval not displayed.       Liver Function Studies -   Recent Labs   Lab Test  11/12/10   1053  03/17/10   1219  09/22/09   1326   ALBUMIN   --    --   3.7   AST  30  24   --    ALT  38  25   --        TSH   Date Value Ref Range Status   04/12/2017 1.05 0.30 - 5.00 uIU/mL Final   11/12/2010 1.05 0.4 - 5.0 mU/L Final       Lab Results   Component Value Date    INR 2.14 04/12/2017     Lab Results   Component Value Date    A1C 5.8 11/12/2010           Assessment/Plan:  Acute low back pain without sciatica, unspecified back pain laterality  Thoracic compression fracture, sequela  Generalized muscle weakness  Personal history of fall  SOB (shortness of breath)  Impaired functional mobility, balance, gait, and endurance  Congestive heart  failure, unspecified congestive heart failure chronicity, unspecified congestive heart failure type (H)  -had fall, CXR on 4/11 positive for L flank rib Fx  -taking lasix 20mg Qd for edema, also has metolazone for increased weight/fluid buildup  -K 20mEq daily effective dosing  -has Os 2-4L via NC, spiriva Qd, albuterol HFA QID, prednisone 5mg Qd, flovent BID  -continues gabapentin and tramadol for chronic back pain  -unable to use walker/manual wheelchair  -ordering power WC with pressure-relief cushion due to mobility limitations and falls risk      Chronic atrial fibrillation (H)  Encounter for therapeutic drug monitoring  Long term (current) use of anticoagulants  -INR 4/21 was 2.7, taking 4mg MWF, 3mg ROW  -INR 4/27 was 2.28, start 3mg T/Th, 4mg ROW  -recheck INR on Wed 5/17/17  -of note, switching to blood draw versus use of accucheck machine for consistency      Electronically signed by  IAN Maloney CNP          Documentation of Face-to-Face and Certification for Home Health Services     Patient: Estefania Brandon   YOB: 1940  MR Number: [id number]  Today's Date: 4/27/2017    Your provider has referred you to: OTHER PROVIDERS:    Extended Emergency Contact Information  Primary Emergency Contact: Brenda Burris  Address: [address] [address] [employer]  Mobile Phone: [phone]  Relation: Daughter  Secondary Emergency Contact: Kannan Brandon  Address: [address] [place] [address] [place]  Work Phone: [phone]  Mobile Phone: [phone]  Relation: Relative    REASON FOR REFERRAL: Mobility examination    ADDITIONAL SERVICES NEEDED: NA    OTHER PERTINENT INFORMATION: Patient was last seen by provider on 4/27/17 for    Acute low back pain without sciatica, unspecified back pain laterality  Thoracic compression fracture, sequela  Generalized muscle weakness  Personal history of fall  SOB (shortness of breath)  Impaired  functional mobility, balance, gait, and endurance  Congestive heart failure, unspecified congestive heart failure chronicity, unspecified congestive heart failure type (H)  Chronic atrial fibrillation (H)  Encounter for therapeutic drug monitoring  Long term (current) use of anticoagulants    Current Outpatient Prescriptions:  Calcium Carb-Cholecalciferol (CALCIUM CARBONATE-VITAMIN D3 PO), Take 1 tablet by mouth 2 times daily Dom 600 - Vit D 800, Disp: , Rfl:   GABAPENTIN PO, Take 300 mg by mouth 2 times daily, Disp: , Rfl:   TRAMADOL HCL PO, Take 25 mg by mouth 3 times daily as needed , Disp: , Rfl:   furosemide (LASIX) 20 MG tablet, Take 1 tablet (20 mg) by mouth daily, Disp: 180 tablet, Rfl: 11  Alum hydroxide-Mag carbonate (GAVISCON EXTRA STRENGTH) 160-105 MG CHEW, Take 1 chew tab by mouth 3 times daily (before meals), Disp: 240 tablet, Rfl: 11  albuterol (2.5 MG/3ML) 0.083% neb solution, Take 1 vial by nebulization 4 times daily, Disp: , Rfl:   ALPRAZOLAM PO, Take 0.25 mg by mouth 2 times daily , Disp: , Rfl:   arformoterol (BROVANA) 15 MCG/2ML NEBU neb solution, Take 15 mcg by nebulization 2 times daily, Disp: , Rfl:   Citalopram Hydrobromide (CELEXA PO), Take 20 mg by mouth daily, Disp: , Rfl:   Warfarin Sodium (COUMADIN PO), Take by mouth daily Take as directed per INR, Disp: , Rfl:   CYANOCOBALAMIN PO, Take 1,000 mcg by mouth daily, Disp: , Rfl:   DULoxetine HCl (CYMBALTA PO), Take 20 mg by mouth daily , Disp: , Rfl:   fluticasone (FLOVENT HFA) 220 MCG/ACT Inhaler, Inhale 2 puffs into the lungs 2 times daily, Disp: , Rfl:   fluticasone (FLONASE) 50 MCG/ACT spray, Spray 1 spray into both nostrils daily as needed for rhinitis or allergies, Disp: , Rfl:   LISINOPRIL PO, Take 2.5 mg by mouth Give 3 times per week, Disp: , Rfl:   loperamide (IMODIUM) 2 MG capsule, Take 2 mg by mouth daily , Disp: , Rfl:   Acetaminophen (TYLENOL PO), Take 1,000 mg by mouth every 6 hours as needed for mild pain or fever, Disp: ,  Rfl:   METOLAZONE PO, Take 2.5 mg by mouth Give 1 time weekly as needed for weight gain of 3 lbs or more, Disp: , Rfl:   METOPROLOL SUCCINATE ER PO, Take 12.5 mg by mouth daily , Disp: , Rfl:   METOPROLOL SUCCINATE ER PO, Take 12.5 mg by mouth daily as needed, Disp: , Rfl:   NITROGLYCERIN SL, Place 0.4 mg under the tongue every 5 minutes as needed for chest pain, Disp: , Rfl:   OMEPRAZOLE PO, Take 20 mg by mouth 2 times daily, Disp: , Rfl:   POTASSIUM CHLORIDE ER PO, Take 20 mEq by mouth daily, Disp: , Rfl:   PREDNISONE PO, Take 5 mg by mouth daily, Disp: , Rfl:   carboxymethylcellulose (REFRESH PLUS) 0.5 % SOLN ophthalmic solution, Place 1-2 drops into both eyes every 4 hours as needed for dry eyes, Disp: , Rfl:   sodium chloride (OCEAN) 0.65 % nasal spray, Spray 1 spray into both nostrils as needed for congestion, Disp: , Rfl:   SIMVASTATIN PO, Take 20 mg by mouth At Bedtime , Disp: , Rfl:   tiotropium (SPIRIVA) 18 MCG capsule, Inhale 18 mcg into the lungs daily, Disp: , Rfl:   albuterol (PROAIR HFA/PROVENTIL HFA/VENTOLIN HFA) 108 (90 BASE) MCG/ACT Inhaler, Inhale 2 puffs into the lungs 4 times daily, Disp: , Rfl:       Patient Active Problem List:     Atrial fibrillation (H)     Hypercoagulable state (H)     COPD (chronic obstructive pulmonary disease) (H)     Essential hypertension, benign     Skin cancer of nose     Chronic suppurative otitis media     Hypercholesteremia     Vitamin B12 deficiency disease     HYPERLIPIDEMIA LDL GOAL <130     Adjustment disorder with anxiety     Mild recurrent major depression (H)     GERD (gastroesophageal reflux disease)     Perforation of tympanic membrane     Impaired fasting glucose     Congestive heart failure, unspecified congestive heart failure chronicity, unspecified congestive heart failure type (H)     Anxiety disorder, unspecified type     Nasal vestibulitis     Dry mouth     Myoclonus     Atrial fibrillation, unspecified type (H)     Essential hypertension      Vitamin B12 deficiency     Gastroesophageal reflux disease, esophagitis presence not specified     Cleveland's esophagus with dysplasia     Chronic kidney disease, stage IV (severe) (H)     C. difficile colitis     Hyperglycemia     Thoracic compression fracture, sequela     Visual hallucinations     Cataract     Floaters, unspecified laterality     Encounter for therapeutic drug monitoring     Long term (current) use of anticoagulants     Fracture of rib      Documentation of Face to Face and Certification for DME:    I certify that patient, Estefania Brandon is under my care and that I, or a Nurse Practitioner or Physician's Assistant working with me, had a face-to-face encounter that meets the physician face-to-face encounter requirements with this patient on: 4/27/2017.    This encounter with the patient was in whole, or in part, for the following medical condition, which is the primary reason for DME: Impaired functional mobility, balance, gait and endurance.    I certify that, based on my findings, the following services are medically necessary DME: Power wheelchair and pressure relief cushion due to mobility limitations and falls risk.     My clinical findings support the need for the above services because: See HPI    Based on the above findings, I certify that this patient requires above DME.  The patient is under my care, and I have initiated the establishment of the plan of care.  This patient will be followed by a physician who will periodically review the plan of care.    Physician/Provider to provide follow up care: Julio César Maria certified Physician at time of discharge: Dr. Liliana Arreola  Electronic Physician Signature  Date: 4/27/2017

## 2017-04-27 ENCOUNTER — ASSISTED LIVING VISIT (OUTPATIENT)
Dept: GERIATRICS | Facility: CLINIC | Age: 77
End: 2017-04-27
Payer: COMMERCIAL

## 2017-04-27 VITALS
BODY MASS INDEX: 27.72 KG/M2 | HEART RATE: 81 BPM | WEIGHT: 154 LBS | DIASTOLIC BLOOD PRESSURE: 89 MMHG | RESPIRATION RATE: 17 BRPM | TEMPERATURE: 97.9 F | SYSTOLIC BLOOD PRESSURE: 166 MMHG

## 2017-04-27 DIAGNOSIS — R06.02 SOB (SHORTNESS OF BREATH): ICD-10-CM

## 2017-04-27 DIAGNOSIS — Z91.81 PERSONAL HISTORY OF FALL: ICD-10-CM

## 2017-04-27 DIAGNOSIS — S22.000S THORACIC COMPRESSION FRACTURE, SEQUELA: ICD-10-CM

## 2017-04-27 DIAGNOSIS — I50.9 CONGESTIVE HEART FAILURE, UNSPECIFIED CONGESTIVE HEART FAILURE CHRONICITY, UNSPECIFIED CONGESTIVE HEART FAILURE TYPE: Primary | ICD-10-CM

## 2017-04-27 DIAGNOSIS — Z51.81 ENCOUNTER FOR THERAPEUTIC DRUG MONITORING: ICD-10-CM

## 2017-04-27 DIAGNOSIS — M62.81 GENERALIZED MUSCLE WEAKNESS: ICD-10-CM

## 2017-04-27 DIAGNOSIS — I48.20 CHRONIC ATRIAL FIBRILLATION (H): ICD-10-CM

## 2017-04-27 DIAGNOSIS — Z74.09 IMPAIRED FUNCTIONAL MOBILITY, BALANCE, GAIT, AND ENDURANCE: ICD-10-CM

## 2017-04-27 DIAGNOSIS — Z79.01 LONG TERM (CURRENT) USE OF ANTICOAGULANTS: ICD-10-CM

## 2017-04-27 DIAGNOSIS — M54.50 ACUTE LOW BACK PAIN WITHOUT SCIATICA, UNSPECIFIED BACK PAIN LATERALITY: ICD-10-CM

## 2017-05-07 ENCOUNTER — TELEPHONE (OUTPATIENT)
Dept: GERIATRICS | Facility: CLINIC | Age: 77
End: 2017-05-07

## 2017-05-08 DIAGNOSIS — E53.8 VITAMIN B12 DEFICIENCY (NON ANEMIC): ICD-10-CM

## 2017-05-08 DIAGNOSIS — I10 BENIGN ESSENTIAL HYPERTENSION: Primary | ICD-10-CM

## 2017-05-09 ENCOUNTER — CARE COORDINATION (OUTPATIENT)
Dept: GERIATRIC MEDICINE | Facility: CLINIC | Age: 77
End: 2017-05-09

## 2017-05-09 NOTE — PROGRESS NOTES
5/9/17 CM received member as new [facility] client. CM called members home phone and spoke with member introducing herself as members new  for the [facility] program. She agreed to set up a home visit but would like her daughter Brenda [phone] to also be there. She states Brenda usually comes down on Fridays. She wanted CM to call Brenda to see if this Friday would be ok.  MILTON called Brenda and left a VM letting her know CM is members new  for the [facility] program and her mom would like to meet with MILTON and Brenda to be there. [facility].  Chantelle Cardona RN BA N  [facility] Case Management  [phone]

## 2017-05-09 NOTE — PROGRESS NOTES
5/9/17 MILTON spoke with daughter Brenda and scheduled the home visit for Friday May 12 at 11am. MILTON also called [facility]  Living nurse Rylie and left a VM letting her know CM will be doing a home visit and will stop by the nursing office for members medication list and services. Also MILTON left her name and number for Rylie to call if she has any service changes needed to the CL tool.  Chantelle Cardona RN BA N  [facility] Case Management  [phone]

## 2017-05-10 RX ORDER — POTASSIUM CHLORIDE 1500 MG/1
20 TABLET, EXTENDED RELEASE ORAL DAILY
Qty: 31 TABLET | Status: SHIPPED | OUTPATIENT
Start: 2017-05-10

## 2017-05-12 ENCOUNTER — CARE COORDINATION (OUTPATIENT)
Dept: GERIATRIC MEDICINE | Facility: CLINIC | Age: 77
End: 2017-05-12

## 2017-05-12 NOTE — PROGRESS NOTES
Home visit 1:1 [facility] Risk Assessment completed on: 5/12/17 with member and her daughter Brenda at the assisted living facility.  [facility]  Member PHQ9 score:  19, member is on antidepressants and has been seeing [facility] mental health nurse Marium. CM will send the PHQ9 results to PCP  Medication management/medications reviewed: AL staff set up and administer medications  See EMR for a list of client diagnoses and medications.  See LTCC for further detailed information.  Member or family will call with any further questions or concerns.  CM will f/u with telephone call scheduled in 6 months and as needed.  See members care plan for further assessment needs, goals, interventions and services.    Chantelle Cardona RN PHN  [facility] Case Management  [phone]

## 2017-05-13 ASSESSMENT — PATIENT HEALTH QUESTIONNAIRE - PHQ9: SUM OF ALL RESPONSES TO PHQ QUESTIONS 1-9: 19

## 2017-05-15 ENCOUNTER — CARE COORDINATION (OUTPATIENT)
Dept: GERIATRIC MEDICINE | Facility: CLINIC | Age: 77
End: 2017-05-15

## 2017-05-15 DIAGNOSIS — K21.9 GASTROESOPHAGEAL REFLUX DISEASE WITHOUT ESOPHAGITIS: Primary | ICD-10-CM

## 2017-05-15 NOTE — PROGRESS NOTES
5/15/17 CM faxed CL tool to [facility] and will mail a copy to member with her care plan. CL tool sent to [facility] to download to [facility]. CM sent information to [facility] to order poise pads.  Chantelle Cardona RN BA N  [facility] Case Management  [phone]

## 2017-05-15 NOTE — PROGRESS NOTES
Faxed copy to AL facility, uploaded into [employer] and emailed copy to Magdalena VELAZCO for auth.      Estefany Alex  Case Management Specialist  [facility]   [phone]

## 2017-05-16 NOTE — PROGRESS NOTES
[facility]  Chief Complaint   Patient presents with     Annual Comprehensive Nursing Home       HPI:    Estefania Brandon is a 76 year old  (1940), who is being seen today for an annual comprehensive visit at [facility] .     Estefania is a complex patient with high level of care needs both mentally and physically,     Today's concerns are:  Congestive heart failure, unspecified congestive heart failure chronicity, unspecified congestive heart failure type (H)  Weight 4/26/17; 154.6lb    Atrial fibrillation, unspecified type (H)  Long term (current) use of anticoagulants  Lab Results   Component Value Date    INR 2.14 04/12/2017     Chronic kidney disease, stage IV (severe) (H)  Creatinine   Date Value Ref Range Status   04/12/2017 0.86 0.60 - 1.10 mg/dL Final       Essential hypertension  Based on JNC-8 goals,  patients age of 76 year old, presence of diabetes or CKD, and goals of care goal BP is  <140/90 mm Hg. patient is stable with current plan of care and routine assessment..  BP readings range:  120/77  188/71  166/89  134/67  174/82  167/98    Mild recurrent major depression (H)  Depression screen done: PHQ-9 Given screen score and clinical assessment patient is stable on current plan of care/interventions of celexa 20mg Qd and on going assessment will continue.      Anxiety disorder, unspecified type  Patient has moderate anxiety, probable relation to personality and SOB, recently on 5/7 change alprazolam to 0.25mg TID plus Q4h PRN, moderate efficacy.    Chronic bronchitis, unspecified chronic bronchitis type (H)  Pulmonary medications reviewed previously and was setup with nebs to improve efficacy, had exacerbation and prednisone was increased to 20mg Qd on 5/7/17, patient adamant about needing increased dose, was instructed on potential wean and vehemently declines lower dosing at this time.    Gastroesophageal reflux disease, esophagitis presence not specified  Current  regimen Omeprazole 20mg po BID.        The health plan new enrollment has happened. I have reviewed the  MDS, the preventative needs,  and facility care plan. The level of care is appropriate. I have reviewed the code status/advanced directives.       ALLERGIES: Bacitracin; Cipro [benzyl alcohol]; Codeine; Neomycin; Plavix [clopidogrel bisulfate]; and Sulfa drugs  PROBLEM LIST:  Patient Active Problem List   Diagnosis     Atrial fibrillation (H)     Hypercoagulable state (H)     COPD (chronic obstructive pulmonary disease) (H)     Essential hypertension, benign     Skin cancer of nose     Chronic suppurative otitis media     Hypercholesteremia     Vitamin B12 deficiency disease     HYPERLIPIDEMIA LDL GOAL <130     Adjustment disorder with anxiety     Mild recurrent major depression (H)     GERD (gastroesophageal reflux disease)     Perforation of tympanic membrane     Impaired fasting glucose     Congestive heart failure, unspecified congestive heart failure chronicity, unspecified congestive heart failure type (H)     Anxiety disorder, unspecified type     Nasal vestibulitis     Dry mouth     Myoclonus     Atrial fibrillation, unspecified type (H)     Essential hypertension     Vitamin B12 deficiency     Gastroesophageal reflux disease, esophagitis presence not specified     Cleveland's esophagus with dysplasia     Chronic kidney disease, stage IV (severe) (H)     C. difficile colitis     Hyperglycemia     Thoracic compression fracture, sequela     Visual hallucinations     Cataract     Floaters, unspecified laterality     Encounter for therapeutic drug monitoring     Long term (current) use of anticoagulants     Fracture of rib     Health Care Home     Advanced directives, counseling/discussion     PAST MEDICAL HISTORY:  has a past medical history of Abnormal Pap smear; Atrial fibrillation (H) (2008); Carotid artery bruit; COPD (chronic obstructive pulmonary disease) (H) (1992); Essential hypertension, benign  (1999); GERD (GASTROESOPHAGEAL REFLUX DISEASE) (11/16/2010); High cholesterol; Hypercoagulable state (H) (1993); Irritable bowel; and Osteoporoses.  PAST SURGICAL HISTORY:  has a past surgical history that includes EARDRUM REVISION; tonsillectomy & adenoidectomy (1950s); and EXCISION SKIN OF NOSE (8-11-10).  FAMILY HISTORY: family history includes Alcohol/Drug in her son; Alzheimer Disease in her brother; C.A.D. in her brother; CANCER in her brother and father; CEREBROVASCULAR DISEASE in her sister; Cancer - colorectal in her father; Cardiovascular in her mother; Connective Tissue Disorder in her daughter and son; Depression in her mother; Genitourinary Problems in her brother; Gynecology in her mother; HEART DISEASE in her brother; Hypertension in her brother, brother, mother, sister, sister, sister, and son; Lipids in her brother, sister, sister, and sister; Psychotic Disorder in her brother; Respiratory in her sister.  SOCIAL HISTORY:  reports that she has quit smoking. Her smoking use included Cigarettes. She has a 70.00 pack-year smoking history. She does not have any smokeless tobacco history on file. She reports that she does not drink alcohol or use illicit drugs.  IMMUNIZATIONS:  Most Recent Immunizations   Administered Date(s) Administered     Influenza (H1N1) 01/08/2010     Influenza (High Dose) 3 valent vaccine 11/05/2010     Influenza (IIV3) 10/02/2009     Pneumococcal 23 valent 12/01/2005     TD (ADULT, 7+) 07/28/2003     Above immunizations pulled from [facility]. [facility] and facility records also reconciled. Outstanding information sent to  to update [facility].  Future immunizations needed:  yearly influenza per facility protocol  MEDICATIONS:  Current Outpatient Prescriptions   Medication Sig Dispense Refill     TRAMADOL HCL PO Take 25 mg by mouth 3 times daily       predniSONE (DELTASONE) 20 MG tablet Take 0.5 tablets (10 mg) by mouth daily (take 20mg 5/18, then 15mg x10 days,  then 10mg Qd 31 tablet 11     budesonide (PULMICORT) 0.25 MG/2ML neb solution Take 0.25 mg by nebulization 2 times daily       ipratropium - albuterol 0.5 mg/2.5 mg/3 mL (DUONEB) 0.5-2.5 (3) MG/3ML neb solution Take 1 vial by nebulization 4 times daily Also give BID PRN       omeprazole (PRILOSEC) 20 MG CR capsule Take 1 capsule (20 mg) by mouth 2 times daily 62 capsule PRN     Potassium Chloride ER 20 MEQ TBCR Take 1 tablet (20 mEq) by mouth daily 31 tablet PRN     cyanocobalamin 1000 MCG TABS Take 1,000 mcg by mouth daily 31 tablet PRN     cyanocobalamin 1000 MCG TABS Take 1,000 mcg by mouth daily 31 tablet PRN     Calcium Carb-Cholecalciferol (CALCIUM CARBONATE-VITAMIN D3 PO) Take 1 tablet by mouth 2 times daily Dom 600 - Vit D 800       TRAMADOL HCL PO Take 25 mg by mouth 3 times daily as needed        ALPRAZOLAM PO Take 0.25 mg by mouth 3 times daily Also give 0.25mg po q4h prn       Warfarin Sodium (COUMADIN PO) Take by mouth daily Take as directed per INR       fluticasone (FLONASE) 50 MCG/ACT spray Spray 1 spray into both nostrils daily        Acetaminophen (TYLENOL PO) Take 1,000 mg by mouth every 6 hours as needed for mild pain or fever       METOLAZONE PO Take 2.5 mg by mouth Give 1 time weekly as needed for weight gain of 3 lbs or more       METOPROLOL SUCCINATE ER PO Take 12.5 mg by mouth daily as needed       NITROGLYCERIN SL Place 0.4 mg under the tongue every 5 minutes as needed for chest pain       carboxymethylcellulose (REFRESH PLUS) 0.5 % SOLN ophthalmic solution Place 1-2 drops into both eyes every 4 hours as needed for dry eyes       sodium chloride (OCEAN) 0.65 % nasal spray Spray 1 spray into both nostrils as needed for congestion       albuterol (PROAIR HFA/PROVENTIL HFA/VENTOLIN HFA) 108 (90 BASE) MCG/ACT Inhaler Inhale 2 puffs into the lungs every 6 hours as needed        furosemide (LASIX) 20 MG tablet Take 1 tablet (20 mg) by mouth daily 31 tablet PRN     Calcium Carb-Cholecalciferol  (CALCIUM 600/VITAMIN D3) 600-800 MG-UNIT TABS Take 1 tablet by mouth 2 times daily 62 tablet PRN     citalopram (CELEXA) 20 MG tablet Take 1 tablet (20 mg) by mouth daily 31 tablet PRN     gabapentin (NEURONTIN) 300 MG capsule Take 1 capsule (300 mg) by mouth 2 times daily 62 capsule PRN     Alum hydroxide-Mag carbonate (GAVISCON EXTRA STRENGTH) 160-105 MG CHEW Take 1 chew tab by mouth 3 times daily AND MAY CHEW 2 TABS EVERY 4 HOURS AS NEEDED 240 tablet PRN     lisinopril (PRINIVIL/ZESTRIL) 2.5 MG tablet Take 1 tablet (2.5 mg) by mouth daily ON Monday, Wednesday, AND Friday 15 tablet PRN     loperamide (IMODIUM) 2 MG capsule Take 1 capsule (2 mg) by mouth daily 31 capsule PRN     metoprolol (TOPROL-XL) 25 MG 24 hr tablet Take 0.5 tablets (12.5 mg) by mouth daily AND MAY TAKE 12.5MG ONCE DAILY AS NEEDED 31 tablet PRN     simvastatin (ZOCOR) 20 MG tablet Take 1 tablet (20 mg) by mouth daily 31 tablet PRN     DULoxetine HCl (CYMBALTA PO) Take 20 mg by mouth daily          Case Management:  I have reviewed the Assisted Living care plan, current immunizations and preventive care/cancer screening.. Future cancer screening is not clinically indicated secondary to age/goals of care.   Patient's desire to return to the community is present, but is not able due to care needs .    Advance Directive Discussion:    I reviewed the current advanced directives as reflected in [facility] and the facility chart.  I reviewed the POLST, resigned, dated and sent to [facility].  I did review the advance directives with the resident.     Team Discussion:  I communicated with the appropriate disciplines involved with the Plan of Care:   Nursing    Patient Goal:  Patient's goal is full cares and interventions.    Information reviewed:  Medications, vital signs, orders, and nursing notes.    ROS:  4 point ROS including Respiratory, CV, GI and , other than that noted in the HPI,  is negative    Exam:  /77  Pulse 91  Temp 98.3  F  (36.8  C)  Resp 17  Ht 5' (1.524 m)  Wt 154 lb 9.6 oz (70.1 kg)  BMI 30.19 kg/m2  GENERAL APPEARANCE: Alert, appears healthy, slightly anxious with L flank pain/SOB complaints  ENT: Mouth and posterior oropharynx normal, moist mucous membranes  RESP: lungs clear to auscultation, diminished breath sounds bilateral bases, limited air movement  CV: Palpation and auscultation of heart done, regular rate and rhythm, edema scant in bilateral lower legs  ABDOMEN: no guarding or rebound, bowel sounds normal  M/S: Gait and station abnormal weak, requires walker, SOB with activity   SKIN: Inspection of skin and subcutaneous tissue baseline  NEURO: Examination of sensation by touch normal, intact  PSYCH: oriented to self/place, memory impaired, affect and mood WNL, anxious       Lab/Diagnostic data:     CBC RESULTS:   Recent Labs   Lab Test 04/12/17  11/12/10   1053   09/22/09   1326   WBC  6.0   --    --   6.7   RBC  3.77*   --    --   4.09   HGB  12.2  12.6   < >  12.6   HCT  38.0   --    --   36.6   MCV  101*   --    --   90   MCH  32.4   --    --   30.8   MCHC  32.1   --    --   34.3   RDW  12.6   --    --   13.0   PLT  165   --    --   144*    < > = values in this interval not displayed.       Last Basic Metabolic Panel:  Recent Labs   Lab Test 04/12/17  11/17/10   1319   10/26/10   1314  05/28/10   1343   NA  142   --    --   139  144   POTASSIUM  4.1   --    --   4.6  4.3   CHLORIDE  98   --    --   97  103   JUANITA  9.1   --    --    --   9.7   CO2  34*   --    --   33*  33*   BUN  20   --    --   17  18   CR  0.86   --    --   1.04  1.01   GLC  88  111*   < >   --   145*    < > = values in this interval not displayed.       Liver Function Studies -   Recent Labs   Lab Test  11/12/10   1053  03/17/10   1219  09/22/09   1326   ALBUMIN   --    --   3.7   AST  30  24   --    ALT  38  25   --        TSH   Date Value Ref Range Status   04/12/2017 1.05 0.30 - 5.00 uIU/mL Final   11/12/2010 1.05 0.4 - 5.0 mU/L Final        Lab Results   Component Value Date    INR 2.14 04/12/2017    INR 3.1 11/17/2010       Lab Results   Component Value Date    A1C 5.8 11/12/2010             ASSESSMENT/PLAN  Congestive heart failure, unspecified congestive heart failure chronicity, unspecified congestive heart failure type (H)  Chronic bronchitis, unspecified chronic bronchitis type (H)  -pharm consulted regarding pulmonary medications with following changes in April 2017:  D/C brovana, spiriva, flovent, and albuterol neb, START duoneb QID and PRN, budesonide neb BID, and continue albuterol HFA PRN.  -patient has long history of SOB, COPD exacerbations, recent Tx in April for bronchiolitis, on 5/7 prednisone increased from 2.5mg Qd to 20mg Qd to reduce SOB  -wean prednisone from 20 to 15mg x10 days then 10mg Qd  -is not using CPAP, instructed to contact equipment provider to have machine and face mask updated and adjusted  -will change fluticasone nasal spray to BID scheduled     Atrial fibrillation, unspecified type (H)  Long term (current) use of anticoagulants  -INR was 3.62  -warfarin was 3mg T/Th, 4mg ROW  -warfarin new dose: 3mg Qd  -recheck INR on Wed 5/31    Chronic kidney disease, stage IV (severe) (H)  -Creat on 4/12/17 was 0.86  -consider lab recheck as indicated    Essential hypertension  -SBP's have been in the 130-150 range  -continues metoprolol succinate 12.5mg Qd, lisinopril 2.5mg MWF, furosemide 20mg Qd  -change lisinopril to 2.5mg Qd in future if SBP's remain >130 or 140  -of note, intermittent anxiety may have role in BP readnings    Mild recurrent major depression (H)  -PHQ-9 on 5/12/17 was 19, elevated  -no s/s depression today, patient very displeased that family is not there every day and facility staff/caregiveres cannot spend more time with  -taking citalopram 20mg, no consideration to increase at this time  -followed by [facility] RN for mental health status in-person weekly, [facility] to have RN out weekly to check on status  soon    Anxiety disorder, unspecified type  -anxiety intermittently high  -alprazolam (historical med, tolerates well) increased on 5/7/17 to 0.25mg TID and 0.25mg Q4h PRN  -per patient provides adequate anxiety control  -will increase tramadol to 25mg TID scheduled and keep the TID PRN    Gastroesophageal reflux disease, esophagitis presence not specified  -recently increased gaviscon to TID  -continue omeprazole BID  -consider weaning omeprazole if indicated       Electronically signed by:  IAN Maloney CNP  [facility]

## 2017-05-17 VITALS
TEMPERATURE: 98.3 F | SYSTOLIC BLOOD PRESSURE: 120 MMHG | BODY MASS INDEX: 30.35 KG/M2 | WEIGHT: 154.6 LBS | HEIGHT: 60 IN | HEART RATE: 91 BPM | RESPIRATION RATE: 17 BRPM | DIASTOLIC BLOOD PRESSURE: 77 MMHG

## 2017-05-17 DIAGNOSIS — E78.2 MIXED HYPERLIPIDEMIA: Primary | ICD-10-CM

## 2017-05-17 DIAGNOSIS — J44.9 CHRONIC OBSTRUCTIVE PULMONARY DISEASE, UNSPECIFIED COPD TYPE (H): ICD-10-CM

## 2017-05-17 DIAGNOSIS — I10 ESSENTIAL HYPERTENSION: ICD-10-CM

## 2017-05-17 DIAGNOSIS — K21.9 GASTROESOPHAGEAL REFLUX DISEASE WITHOUT ESOPHAGITIS: ICD-10-CM

## 2017-05-17 DIAGNOSIS — F32.A DEPRESSION, UNSPECIFIED DEPRESSION TYPE: ICD-10-CM

## 2017-05-17 DIAGNOSIS — M81.0 OSTEOPOROSIS: ICD-10-CM

## 2017-05-17 DIAGNOSIS — G62.9 NEUROPATHY: ICD-10-CM

## 2017-05-17 DIAGNOSIS — R19.7 DIARRHEA, UNSPECIFIED TYPE: ICD-10-CM

## 2017-05-17 RX ORDER — IPRATROPIUM BROMIDE AND ALBUTEROL SULFATE 2.5; .5 MG/3ML; MG/3ML
1 SOLUTION RESPIRATORY (INHALATION) 4 TIMES DAILY
COMMUNITY
End: 2017-06-01

## 2017-05-17 RX ORDER — BUDESONIDE 0.25 MG/2ML
0.25 INHALANT ORAL 2 TIMES DAILY
COMMUNITY
End: 2017-06-29

## 2017-05-18 ENCOUNTER — ASSISTED LIVING VISIT (OUTPATIENT)
Dept: GERIATRICS | Facility: CLINIC | Age: 77
End: 2017-05-18
Payer: COMMERCIAL

## 2017-05-18 DIAGNOSIS — N18.4 CHRONIC KIDNEY DISEASE, STAGE IV (SEVERE) (H): ICD-10-CM

## 2017-05-18 DIAGNOSIS — K21.9 GASTROESOPHAGEAL REFLUX DISEASE, ESOPHAGITIS PRESENCE NOT SPECIFIED: ICD-10-CM

## 2017-05-18 DIAGNOSIS — F41.9 ANXIETY DISORDER, UNSPECIFIED TYPE: ICD-10-CM

## 2017-05-18 DIAGNOSIS — F33.0 MILD RECURRENT MAJOR DEPRESSION (H): ICD-10-CM

## 2017-05-18 DIAGNOSIS — I48.91 ATRIAL FIBRILLATION, UNSPECIFIED TYPE (H): ICD-10-CM

## 2017-05-18 DIAGNOSIS — I50.9 CONGESTIVE HEART FAILURE, UNSPECIFIED CONGESTIVE HEART FAILURE CHRONICITY, UNSPECIFIED CONGESTIVE HEART FAILURE TYPE: Primary | ICD-10-CM

## 2017-05-18 DIAGNOSIS — I10 ESSENTIAL HYPERTENSION: ICD-10-CM

## 2017-05-18 DIAGNOSIS — Z79.01 LONG TERM (CURRENT) USE OF ANTICOAGULANTS: ICD-10-CM

## 2017-05-18 DIAGNOSIS — J42 CHRONIC BRONCHITIS, UNSPECIFIED CHRONIC BRONCHITIS TYPE (H): ICD-10-CM

## 2017-05-18 DIAGNOSIS — J44.9 CHRONIC OBSTRUCTIVE PULMONARY DISEASE, UNSPECIFIED COPD TYPE (H): ICD-10-CM

## 2017-05-18 PROCEDURE — 99207 ZZC CDG-DOWN CODE MED NECESSITY: CPT | Performed by: NURSE PRACTITIONER

## 2017-05-18 RX ORDER — PREDNISONE 20 MG/1
20 TABLET ORAL DAILY
Qty: 31 TABLET | Status: SHIPPED | OUTPATIENT
Start: 2017-05-18 | End: 2017-05-18

## 2017-05-18 RX ORDER — METOPROLOL SUCCINATE 25 MG/1
12.5 TABLET, EXTENDED RELEASE ORAL DAILY
Qty: 31 TABLET | Status: SHIPPED | OUTPATIENT
Start: 2017-05-18 | End: 2017-11-02

## 2017-05-18 RX ORDER — PREDNISONE 20 MG/1
10 TABLET ORAL DAILY
Qty: 31 TABLET | Refills: 11
Start: 2017-05-18 | End: 2017-06-22

## 2017-05-18 RX ORDER — LISINOPRIL 2.5 MG/1
2.5 TABLET ORAL DAILY
Qty: 15 TABLET | Status: SHIPPED | OUTPATIENT
Start: 2017-05-18 | End: 2017-11-09

## 2017-05-18 RX ORDER — SIMVASTATIN 20 MG
20 TABLET ORAL DAILY
Qty: 31 TABLET | Status: SHIPPED | OUTPATIENT
Start: 2017-05-18 | End: 2018-01-01

## 2017-05-18 RX ORDER — LOPERAMIDE HCL 2 MG
2 CAPSULE ORAL DAILY
Qty: 31 CAPSULE | Status: SHIPPED | OUTPATIENT
Start: 2017-05-18

## 2017-05-18 RX ORDER — FUROSEMIDE 20 MG
20 TABLET ORAL DAILY
Qty: 31 TABLET | Status: SHIPPED | OUTPATIENT
Start: 2017-05-18

## 2017-05-18 RX ORDER — GABAPENTIN 300 MG/1
300 CAPSULE ORAL 2 TIMES DAILY
Qty: 62 CAPSULE | Status: SHIPPED | OUTPATIENT
Start: 2017-05-18 | End: 2018-01-01

## 2017-05-18 RX ORDER — CITALOPRAM HYDROBROMIDE 20 MG/1
20 TABLET ORAL DAILY
Qty: 31 TABLET | Status: SHIPPED | OUTPATIENT
Start: 2017-05-18 | End: 2018-01-01

## 2017-05-24 ENCOUNTER — CARE COORDINATION (OUTPATIENT)
Dept: GERIATRIC MEDICINE | Facility: CLINIC | Age: 77
End: 2017-05-24

## 2017-05-24 NOTE — PROGRESS NOTES
5/24/17  MILTON received a VM from Carmella GUERIN at [facility] [phone]  She is letting CM know that they are in the process of ordering a power wheelchair for Estefania. They are also asking about her weekly nursing services and who is providing this. Estefania is telling them she used to get PT with massage and ultrasound before moving to [facility] and is wondering who provides this here.  MILTON called Carmella back and left a VM. If member qualifies for the power wheelchair under Medicare that is fine. Member does not have enough EW money to pay for a power wheelchair due to her AL services costs.   Nursing services are provided for by the AL. It is a part of her services covered under .  PT with ultrasound and massage  MILTON does not know where this is done. MILTON does not know if this is covered by [facility]. Carmella may know being an OT she may be more familiar with what agency provides this type of therapy more so than MILTON. Estefania may need to check with [facility] or if she finds an agency that does this they need to check with [facility] to see what is covered. Is this a needed service or something Estefania wishses to do. MILTON would think she will need a physician's order for this.  MILTON has reached out to coworkers to see if anyone is familiar with this type of therapy and will let Carmella know if MILTON finds something else out.  Chantelle Cardona RN BA PHN  [facility]  [phone]

## 2017-06-01 ENCOUNTER — ASSISTED LIVING VISIT (OUTPATIENT)
Dept: GERIATRICS | Facility: CLINIC | Age: 77
End: 2017-06-01
Payer: COMMERCIAL

## 2017-06-01 VITALS
RESPIRATION RATE: 17 BRPM | BODY MASS INDEX: 30.19 KG/M2 | TEMPERATURE: 98.3 F | HEART RATE: 91 BPM | SYSTOLIC BLOOD PRESSURE: 120 MMHG | DIASTOLIC BLOOD PRESSURE: 77 MMHG | WEIGHT: 154.6 LBS

## 2017-06-01 DIAGNOSIS — I48.91 ATRIAL FIBRILLATION, UNSPECIFIED TYPE (H): ICD-10-CM

## 2017-06-01 DIAGNOSIS — J44.9 CHRONIC OBSTRUCTIVE PULMONARY DISEASE, UNSPECIFIED COPD TYPE (H): Primary | ICD-10-CM

## 2017-06-01 DIAGNOSIS — F41.9 ANXIETY DISORDER, UNSPECIFIED TYPE: ICD-10-CM

## 2017-06-01 DIAGNOSIS — I10 ESSENTIAL HYPERTENSION, BENIGN: ICD-10-CM

## 2017-06-01 PROCEDURE — 99207 ZZC CDG-CORRECTLY CODED, REVIEWED AND AGREE: CPT | Performed by: NURSE PRACTITIONER

## 2017-06-01 NOTE — TELEPHONE ENCOUNTER
DRUG: IPRATROPIUM/ALBUTEROL  LAST FILL DATE: 5/5/17  LAST FILL QTY: 360ML    AMARIS CUNNINGHAM CPhT   [facility]

## 2017-06-02 RX ORDER — IPRATROPIUM BROMIDE AND ALBUTEROL SULFATE 2.5; .5 MG/3ML; MG/3ML
1 SOLUTION RESPIRATORY (INHALATION) 4 TIMES DAILY
Qty: 360 ML | Refills: 11 | Status: SHIPPED | OUTPATIENT
Start: 2017-06-02 | End: 2017-01-01 | Stop reason: DRUGHIGH

## 2017-06-08 ENCOUNTER — ASSISTED LIVING VISIT (OUTPATIENT)
Dept: GERIATRICS | Facility: CLINIC | Age: 77
End: 2017-06-08
Payer: COMMERCIAL

## 2017-06-08 VITALS
WEIGHT: 154.6 LBS | RESPIRATION RATE: 16 BRPM | HEART RATE: 100 BPM | SYSTOLIC BLOOD PRESSURE: 118 MMHG | TEMPERATURE: 98.3 F | BODY MASS INDEX: 30.19 KG/M2 | DIASTOLIC BLOOD PRESSURE: 76 MMHG

## 2017-06-08 DIAGNOSIS — Z51.81 ENCOUNTER FOR THERAPEUTIC DRUG MONITORING: ICD-10-CM

## 2017-06-08 DIAGNOSIS — I48.91 ATRIAL FIBRILLATION, UNSPECIFIED TYPE (H): Primary | ICD-10-CM

## 2017-06-08 DIAGNOSIS — Z79.01 LONG TERM (CURRENT) USE OF ANTICOAGULANTS: ICD-10-CM

## 2017-06-08 NOTE — PROGRESS NOTES
[facility]    HPI:    Estefania Brandon is a 76 year old  (1940), who is being seen today for an episodic care visit at [facility].   HPI information obtained from: facility chart records, facility staff and [facility] chart review. Today's concern is INR/Coumadin management for A. Fib    Bleeding Signs/Symptoms:  None  Thromboembolic Signs/Symptoms:  None    Medication Changes:  No  Dietary Changes:  No  Activity Changes: No  Bacterial/Viral Infection:  No    Missed Coumadin Doses:  None    On ASA: No    Other Concerns:  No    OBJECTIVE:    INR Today:  1.94  Current Dose:  3mg Qd    ASSESSMENT:     Atrial fibrillation, unspecified type (H)  Long term (current) use of anticoagulants  Encounter for therapeutic drug monitoring  Subtherapeutic INR for goal of 2-3    PLAN:    New Dose: 2mg T/Th, 4mg ROW      Next INR: Wed 6/21/17      IAN Maloney CNP  [facility]

## 2017-06-20 DIAGNOSIS — J44.9 CHRONIC OBSTRUCTIVE PULMONARY DISEASE, UNSPECIFIED COPD TYPE (H): ICD-10-CM

## 2017-06-21 RX ORDER — PREDNISONE 10 MG/1
10 TABLET ORAL DAILY
Qty: 31 TABLET | Status: SHIPPED | OUTPATIENT
Start: 2017-06-21 | End: 2018-01-01

## 2017-06-22 ENCOUNTER — ASSISTED LIVING VISIT (OUTPATIENT)
Dept: GERIATRICS | Facility: CLINIC | Age: 77
End: 2017-06-22
Payer: COMMERCIAL

## 2017-06-22 VITALS
DIASTOLIC BLOOD PRESSURE: 76 MMHG | RESPIRATION RATE: 16 BRPM | BODY MASS INDEX: 30.19 KG/M2 | WEIGHT: 154.6 LBS | HEART RATE: 100 BPM | SYSTOLIC BLOOD PRESSURE: 118 MMHG | TEMPERATURE: 98.3 F

## 2017-06-22 DIAGNOSIS — Z79.01 LONG TERM CURRENT USE OF ANTICOAGULANT THERAPY: ICD-10-CM

## 2017-06-22 DIAGNOSIS — Z51.81 ENCOUNTER FOR THERAPEUTIC DRUG MONITORING: ICD-10-CM

## 2017-06-22 DIAGNOSIS — I48.91 ATRIAL FIBRILLATION, UNSPECIFIED TYPE (H): Primary | ICD-10-CM

## 2017-06-22 NOTE — PROGRESS NOTES
[facility]    HPI:    Estefania Brandon is a 76 year old  (1940), who is being seen today for an episodic care visit at [facility].   HPI information obtained from: facility chart records, facility staff, patient report and [facility] chart review. Today's concern is INR/Coumadin management for A. Fib    Bleeding Signs/Symptoms:  None  Thromboembolic Signs/Symptoms:  None    Medication Changes:  No  Dietary Changes:  No  Activity Changes: No  Bacterial/Viral Infection:  No    Missed Coumadin Doses:  None    On ASA: No    Other Concerns:  No    OBJECTIVE:    INR Today:  2.40  Current Dose:  2mg T/Th, 4mg ROW    ASSESSMENT:     Atrial fibrillation, unspecified type (H)  Long term current use of anticoagulant therapy  Encounter for therapeutic drug monitoring  Therapeutic INR for goal of 2-3    PLAN:    New Dose: 4mg MWF, 2mg ROW      Next INR: Wed 7/5/17      IAN Maloney CNP  [facility]

## 2017-06-29 DIAGNOSIS — J44.9 CHRONIC OBSTRUCTIVE PULMONARY DISEASE, UNSPECIFIED COPD TYPE (H): Primary | ICD-10-CM

## 2017-06-29 RX ORDER — BUDESONIDE 0.25 MG/2ML
0.25 INHALANT ORAL 2 TIMES DAILY
Qty: 1 BOX | Refills: 11
Start: 2017-06-29 | End: 2017-07-05

## 2017-06-29 NOTE — TELEPHONE ENCOUNTER
BUDESONIDE DANISHA 0.25 MG / 2 ML    PLEASE REFILL FOR [facility] - 12 / PRN REFILLS IF ABLE - THANKS!      Jesse Lowry CPhT  Pharmacy Technician  [email or web address]  [address] [place] [address]  Phone: [phone]  Fax: [phone]

## 2017-07-05 DIAGNOSIS — J44.9 CHRONIC OBSTRUCTIVE PULMONARY DISEASE, UNSPECIFIED COPD TYPE (H): ICD-10-CM

## 2017-07-05 RX ORDER — BUDESONIDE 0.25 MG/2ML
0.25 INHALANT ORAL 2 TIMES DAILY
Qty: 2 BOX | Refills: 98 | Status: SHIPPED | OUTPATIENT
Start: 2017-07-05

## 2017-07-05 NOTE — PROGRESS NOTES
[facility]    HPI:    Estefania Brandon is a 76 year old  (1940), who is being seen today for an episodic care visit at [facility].   HPI information obtained from: facility chart records, patient report and [facility] chart review. Today's concern is INR/Coumadin management for A. Fib    Bleeding Signs/Symptoms:  None  Thromboembolic Signs/Symptoms:  None    Medication Changes:  No  Dietary Changes:  No  Activity Changes: No  Bacterial/Viral Infection:  No    Missed Coumadin Doses:  None    On ASA: No    Other Concerns:  No    OBJECTIVE:    INR Today:  1.24 (was 2.4 on 6/22, stable on dosing)  Current Dose:  4mg MWF, 2mg ROW    ASSESSMENT:     Atrial fibrillation, unspecified type (H)  Long term (current) use of anticoagulants  Encounter for therapeutic drug monitoring  Subtherapeutic INR for goal of 2-3    PLAN:    New Dose: 2mg T/Th, 4mg ROW      Next INR: Wed 7/19/17      IAN Maloney CNP  [facility]

## 2017-07-06 ENCOUNTER — ASSISTED LIVING VISIT (OUTPATIENT)
Dept: GERIATRICS | Facility: CLINIC | Age: 77
End: 2017-07-06
Payer: COMMERCIAL

## 2017-07-06 VITALS
RESPIRATION RATE: 19 BRPM | TEMPERATURE: 98.3 F | DIASTOLIC BLOOD PRESSURE: 74 MMHG | SYSTOLIC BLOOD PRESSURE: 127 MMHG | HEART RATE: 96 BPM | BODY MASS INDEX: 30.19 KG/M2 | WEIGHT: 154.6 LBS

## 2017-07-06 DIAGNOSIS — Z79.01 LONG TERM (CURRENT) USE OF ANTICOAGULANTS: ICD-10-CM

## 2017-07-06 DIAGNOSIS — Z51.81 ENCOUNTER FOR THERAPEUTIC DRUG MONITORING: ICD-10-CM

## 2017-07-06 DIAGNOSIS — I48.91 ATRIAL FIBRILLATION, UNSPECIFIED TYPE (H): Primary | ICD-10-CM

## 2017-07-20 ENCOUNTER — ASSISTED LIVING VISIT (OUTPATIENT)
Dept: GERIATRICS | Facility: CLINIC | Age: 77
End: 2017-07-20
Payer: COMMERCIAL

## 2017-07-20 DIAGNOSIS — Z79.01 LONG TERM (CURRENT) USE OF ANTICOAGULANTS: ICD-10-CM

## 2017-07-20 DIAGNOSIS — K22.719 BARRETT'S ESOPHAGUS WITH DYSPLASIA: ICD-10-CM

## 2017-07-20 DIAGNOSIS — Z51.81 ENCOUNTER FOR THERAPEUTIC DRUG MONITORING: ICD-10-CM

## 2017-07-20 DIAGNOSIS — J44.9 CHRONIC OBSTRUCTIVE PULMONARY DISEASE, UNSPECIFIED COPD TYPE (H): ICD-10-CM

## 2017-07-20 DIAGNOSIS — I48.20 CHRONIC ATRIAL FIBRILLATION (H): Primary | ICD-10-CM

## 2017-07-20 PROCEDURE — 99207 ZZC CDG-CORRECTLY CODED, REVIEWED AND AGREE: CPT | Performed by: NURSE PRACTITIONER

## 2017-07-20 NOTE — PROGRESS NOTES
[place] [facility]    Chief Complaint   Patient presents with     RECHECK       HPI:    Estefania Brandon is a 76 year old  (1940), who is being seen today for an episodic care visit at [facility] .  HPI information obtained from: facility chart records, facility staff, patient report and [facility] chart review.        Today's concern is:     Chronic atrial fibrillation (H)  Long term (current) use of anticoagulants  Encounter for therapeutic drug monitoring  Chronic obstructive pulmonary disease, unspecified COPD type (H)  Cleveland's esophagus with dysplasia     Patient seen today on request of SOB, pain; was in room curling hair, appeared to be doing well, then changed from no-distress to distressed with SOB, anxiety, coughing, therapeutic communication offered and patient was at baseline in 4-5 minutes, then offered me a list of physical issues to work through; abdominal pain LLQ with no apparent cause, states it feels like a hernia, SOB/cough with no s/s infection and on oxygen/COPD medications, washcloth with pink tinge with make-up on it, lost a tooth, family never visits, requesting massage therapy, wants new glasses. Denies CP/palpitations.    Review of above complaints there is no acute findings, I&O adequate, considering loneliness with moderate anxiety, was quite calm when instructed that she appears fine and has no overt issues, in talking agrees that maybe she really is OK, was recently treated with Zpak for SOB/bronchitis, overall status is physically stable with age-related physical and cognitive decline.    ALLERGIES: Bacitracin; Cipro [benzyl alcohol]; Codeine; Neomycin; Plavix [clopidogrel bisulfate]; and Sulfa drugs  Past Medical, Surgical, Family and Social History reviewed and updated in [facility].    Current Outpatient Prescriptions   Medication Sig Dispense Refill     budesonide (PULMICORT) 0.25 MG/2ML neb solution Take 2 mLs (0.25 mg) by nebulization 2 times daily 2  [address]     predniSONE (DELTASONE) 10 MG tablet Take 1 tablet (10 mg) by mouth daily 31 tablet PRN     ipratropium - albuterol 0.5 mg/2.5 mg/3 mL (DUONEB) 0.5-2.5 (3) MG/3ML neb solution Take 1 vial (3 mLs) by nebulization 4 times daily Also give BID  mL 11     Trolamine Salicylate (ASPERCREME) 10 % LOTN Externally apply topically 3 times daily       furosemide (LASIX) 20 MG tablet Take 1 tablet (20 mg) by mouth daily 31 tablet PRN     Calcium Carb-Cholecalciferol (CALCIUM 600/VITAMIN D3) 600-800 MG-UNIT TABS Take 1 tablet by mouth 2 times daily 62 tablet PRN     citalopram (CELEXA) 20 MG tablet Take 1 tablet (20 mg) by mouth daily 31 tablet PRN     gabapentin (NEURONTIN) 300 MG capsule Take 1 capsule (300 mg) by mouth 2 times daily 62 capsule PRN     Alum hydroxide-Mag carbonate (GAVISCON EXTRA STRENGTH) 160-105 MG CHEW Take 1 chew tab by mouth 3 times daily AND MAY CHEW 2 TABS EVERY 4 HOURS AS NEEDED 240 tablet PRN     lisinopril (PRINIVIL/ZESTRIL) 2.5 MG tablet Take 1 tablet (2.5 mg) by mouth daily ON Monday, Wednesday, AND Friday 15 tablet PRN     loperamide (IMODIUM) 2 MG capsule Take 1 capsule (2 mg) by mouth daily 31 capsule PRN     metoprolol (TOPROL-XL) 25 MG 24 hr tablet Take 0.5 tablets (12.5 mg) by mouth daily AND MAY TAKE 12.5MG ONCE DAILY AS NEEDED 31 tablet PRN     simvastatin (ZOCOR) 20 MG tablet Take 1 tablet (20 mg) by mouth daily 31 tablet PRN     omeprazole (PRILOSEC) 20 MG CR capsule Take 1 capsule (20 mg) by mouth 2 times daily 62 capsule PRN     Potassium Chloride ER 20 MEQ TBCR Take 1 tablet (20 mEq) by mouth daily 31 tablet PRN     cyanocobalamin 1000 MCG TABS Take 1,000 mcg by mouth daily 31 tablet PRN     TRAMADOL HCL PO Take 25 mg by mouth 3 times daily as needed        ALPRAZOLAM PO Take 0.25 mg by mouth 3 times daily Also give 0.25mg po q4h prn       Warfarin Sodium (COUMADIN PO) Take by mouth daily Take as directed per INR       DULoxetine HCl (CYMBALTA PO) Take 20 mg by mouth  daily        fluticasone (FLONASE) 50 MCG/ACT spray Spray 2 sprays into both nostrils daily        Acetaminophen (TYLENOL PO) Take 1,000 mg by mouth every 6 hours as needed for mild pain or fever       METOLAZONE PO Take 2.5 mg by mouth Give 1 time weekly as needed for weight gain of 3 lbs or more       NITROGLYCERIN SL Place 0.4 mg under the tongue every 5 minutes as needed for chest pain       carboxymethylcellulose (REFRESH PLUS) 0.5 % SOLN ophthalmic solution Place 1-2 drops into both eyes every 4 hours as needed for dry eyes       sodium chloride (OCEAN) 0.65 % nasal spray Spray 1 spray into both nostrils as needed for congestion       albuterol (PROAIR HFA/PROVENTIL HFA/VENTOLIN HFA) 108 (90 BASE) MCG/ACT Inhaler Inhale 2 puffs into the lungs every 6 hours as needed        Medications reviewed:  Medications reconciled to facility chart and changes were made to reflect current medications as identified as above med list. Below are the changes that were made:   Medications stopped since last EPIC medication reconciliation:   There are no discontinued medications.    Medications started since last Psychiatric medication reconciliation:  No orders of the defined types were placed in this encounter.      REVIEW OF SYSTEMS:  4 point ROS including Respiratory, CV, GI and , other than that noted in the HPI,  is negative    Physical Exam:  There were no vitals taken for this visit.  GENERAL APPEARANCE:  Alert, in no distress, appears healthy  ENT:  Mouth and posterior oropharynx normal, moist mucous membranes  RESP:  lungs clear to auscultation , no respiratory distress, diminished breath sounds bilateral bases  CV:  Palpation and auscultation of heart done , regular rate and rhythm, no murmur, rub, or gallop, no edema  ABDOMEN:  no guarding or rebound, bowel sounds normal  M/S:   Gait and station abnormal requires walker/WC for distance  SKIN:  Inspection of skin and subcutaneous tissue baseline  NEURO:   Examination of  sensation by touch normal  PSYCH:  oriented to self/place, affect and mood normal    Recent Labs:      CBC RESULTS:   Recent Labs   Lab Test 04/12/17  11/12/10   1053   09/22/09   1326   WBC  6.0   --    --   6.7   RBC  3.77*   --    --   4.09   HGB  12.2  12.6   < >  12.6   HCT  38.0   --    --   36.6   MCV  101*   --    --   90   MCH  32.4   --    --   30.8   MCHC  32.1   --    --   34.3   RDW  12.6   --    --   13.0   PLT  165   --    --   144*    < > = values in this interval not displayed.       Last Basic Metabolic Panel:  Recent Labs   Lab Test 04/12/17  11/17/10   1319   10/26/10   1314  05/28/10   1343   NA  142   --    --   139  144   POTASSIUM  4.1   --    --   4.6  4.3   CHLORIDE  98   --    --   97  103   JUANITA  9.1   --    --    --   9.7   CO2  34*   --    --   33*  33*   BUN  20   --    --   17  18   CR  0.86   --    --   1.04  1.01   GLC  88  111*   < >   --   145*    < > = values in this interval not displayed.       Liver Function Studies -   Recent Labs   Lab Test  11/12/10   1053  03/17/10   1219  09/22/09   1326   ALBUMIN   --    --   3.7   AST  30  24   --    ALT  38  25   --        TSH   Date Value Ref Range Status   04/12/2017 1.05 0.30 - 5.00 uIU/mL Final   11/12/2010 1.05 0.4 - 5.0 mU/L Final       Lab Results   Component Value Date    INR 2.14 04/12/2017       Lab Results   Component Value Date    A1C 5.8 11/12/2010           Assessment/Plan:  Chronic atrial fibrillation (H)  Long term (current) use of anticoagulants  Encounter for therapeutic drug monitoring  -INR was 2.52  -warfarin 2mg MWF, 4mg ROW  -recheck INR on Wed 8/2 to follow up    Chronic obstructive pulmonary disease, unspecified COPD type (H)  -patient chronic COPD with O2 use 24/7  -recently treated for bronchitis with Zpak  -referral to ENT to evaluate nasal pain/discomfort as above      Moffett's esophagus with dysplasia  -historical, swallows well, had images from 2 years ago regarding polyps, moffett's, and paraesophageal  hernia  -referral to GI to evaluate/treat as above      Electronically signed by  IAN Maloney [facility]

## 2017-07-22 ENCOUNTER — DOCUMENTATION ONLY (OUTPATIENT)
Dept: GERIATRICS | Facility: CLINIC | Age: 77
End: 2017-07-22

## 2017-07-22 NOTE — PROGRESS NOTES
Received a call from nursing today that Estefania was complaining of chest pain.  Was treated with Z- carmen most recently and feels there is something still in her chest area.  T= 97.2, B/P = 145/82    PLAN:  Repeat Z-carmen 500mg today and 250mg following 4 days.    Electronically signed by Karina Pearles RN, CNP

## 2017-07-26 ENCOUNTER — CARE COORDINATION (OUTPATIENT)
Dept: GERIATRIC MEDICINE | Facility: CLINIC | Age: 77
End: 2017-07-26

## 2017-08-02 ENCOUNTER — TRANSFERRED RECORDS (OUTPATIENT)
Dept: HEALTH INFORMATION MANAGEMENT | Facility: CLINIC | Age: 77
End: 2017-08-02

## 2017-08-02 LAB — INR PPP: 2.26 (ref 0.9–1.1)

## 2017-08-03 ENCOUNTER — ASSISTED LIVING VISIT (OUTPATIENT)
Dept: GERIATRICS | Facility: CLINIC | Age: 77
End: 2017-08-03
Payer: COMMERCIAL

## 2017-08-03 VITALS
SYSTOLIC BLOOD PRESSURE: 127 MMHG | WEIGHT: 151.5 LBS | BODY MASS INDEX: 29.59 KG/M2 | DIASTOLIC BLOOD PRESSURE: 74 MMHG | HEART RATE: 96 BPM | TEMPERATURE: 98.3 F | RESPIRATION RATE: 19 BRPM

## 2017-08-03 DIAGNOSIS — I48.20 CHRONIC ATRIAL FIBRILLATION (H): Primary | ICD-10-CM

## 2017-08-03 DIAGNOSIS — Z79.01 LONG TERM (CURRENT) USE OF ANTICOAGULANTS: ICD-10-CM

## 2017-08-03 DIAGNOSIS — Z51.81 ENCOUNTER FOR THERAPEUTIC DRUG MONITORING: ICD-10-CM

## 2017-08-03 NOTE — PROGRESS NOTES
[place] [facility]    HPI:    Estefania Brandon is a 76 year old  (1940), who is being seen today for an episodic care visit at [facility].   HPI information obtained from: facility chart records, facility staff, patient report and [facility] chart review. Today's concern is INR/Coumadin management for A. Fib    Bleeding Signs/Symptoms:  None  Thromboembolic Signs/Symptoms:  None    Medication Changes:  No  Dietary Changes:  No  Activity Changes: No  Bacterial/Viral Infection:  No    Missed Coumadin Doses:  None    On ASA: No    Other Concerns:  No    OBJECTIVE:    INR Today: 2.26   Current Dose:  2mg MWF, 4mg ROW    ASSESSMENT:     Chronic atrial fibrillation (H)  Encounter for therapeutic drug monitoring  Long term (current) use of anticoagulants  Therapeutic INR for goal of 2-3    PLAN:    New Dose: 2mg T/Th, 4mg ROW      Next INR: Wed 8/23/17      IAN Malnoey CNP  [facility]

## 2017-08-08 ENCOUNTER — CARE COORDINATION (OUTPATIENT)
Dept: GERIATRIC MEDICINE | Facility: CLINIC | Age: 77
End: 2017-08-08

## 2017-08-08 NOTE — PROGRESS NOTES
8/8/17 MILTON received another email from [facility] asking for the signed Rx by Dr Maria for the incontinent pads requested. MILTON called the nurse line at [facility] and left a detailed message that MILTON had called and spoken with a male on 7/26/17 at their office and inquired about this Rx that was sent to Estefania for Dr Maria to sign. The nurse stated he did not know anything about it but would check with Estefania and see if she has it somewhere. [facility] also had faxed another Rx directly to the nurses station as they had said Dr Maria would be in the next day and they could have him sign it. [facility] has not gotten anything back.  LMTRC.  Chantelle Cardona RN BA N  [facility] Case Management  [phone]

## 2017-08-09 ENCOUNTER — CARE COORDINATION (OUTPATIENT)
Dept: GERIATRIC MEDICINE | Facility: CLINIC | Age: 77
End: 2017-08-09

## 2017-08-09 NOTE — PROGRESS NOTES
8/9/17 MILTON called Estefania to see if she ever received the RX from [facility] for her incontinent products and she did several weeks ago. She gave it to the nurses at [facility] to have Dr Maria sign it.  MILTON left a message for the nursing staff at [facility] asking them to find the Rx and have it sent back to [facility] so Kay can get her incontinent supplies. If they do not have it then have Dr Maria sign another one and get it to [facility].  MILTON sent an email back to [facility] with this information and also gave the the number to [facility].  Chantelle Cardona RN BA N  [facility] Case Management  [phone]

## 2017-08-09 NOTE — PROGRESS NOTES
8/9/17 MILTON spoke with Yanet care coordinator at [facility] [phone]. She has the Rx and what is written on it is confusing and needs to be reviewed before the doctor will sign it. It has listed Poise pads extra quantity 108. Then additional information Pullups 150, Pads 300, Breifs 300. She does not need all of that. She only needs the poise pads.  MILTON wrote and email back to [facility] with his information asking them to explain the rx. MILTON had ordered only Poise pads regular size with moderate absorbency and a quantity of 60/m.MILTON also asked if they can get back to  right away because Dr Maria will be in at [facility] tomorrow and they can have him sign it. Email sent with high importance.  Chantelle Cardona RN BA N  [facility] Case Management  [phone]

## 2017-08-09 NOTE — PROGRESS NOTES
8/9/17 CM called [facility] medical and asked about the order sent to [facility]. They said the additional information section is only so the physician knows what is allowable by insurance not what has been ordered.  The quantity 108 was because they put it bymonthly but will change it to 54/month. That is how many is in a pack.  CM will notify Yanet at [facility].  Chantelle Cardona RN BA N  [facility] Case Management  [phone]

## 2017-08-12 ENCOUNTER — TRANSFERRED RECORDS (OUTPATIENT)
Dept: HEALTH INFORMATION MANAGEMENT | Facility: CLINIC | Age: 77
End: 2017-08-12

## 2017-08-16 VITALS
HEART RATE: 97 BPM | BODY MASS INDEX: 29.59 KG/M2 | TEMPERATURE: 98.3 F | RESPIRATION RATE: 20 BRPM | WEIGHT: 151.5 LBS | DIASTOLIC BLOOD PRESSURE: 47 MMHG | SYSTOLIC BLOOD PRESSURE: 150 MMHG

## 2017-08-16 NOTE — PROGRESS NOTES
"[place] [facility]  ASSISTED LIVING INITIAL VISIT NOTE  August 17, 2017    PRIMARY CARE PROVIDER AND CLINIC:  Julio César Maria [address] [place] [address] / [facility] [address]    Chief Complaint   Patient presents with     Establish Care       HPI:    Estefania Brandon is a 76 year old  (1940) female who was seen at [facility] on August 17, 2017 for an initial visit. Medical history is notable for COPD, chronic hypoxic respiratory failure, CHF, atrial fibrillation, GERD/Cleveland's esophagus, CKD IV and anxiety. She moved to this facility from the [facility] in early April of this year. Per review of PCP's notes, at baseline could ambulate about 30 feet due to her severe COPD and her PCP, in late March, completed a face to face for an electric scooter to help with mobility. Since arriving at [facility], Ms. Brandon has endorsed L flank pain and CXR with acute posterior L 7th rib fracture as well as age indeterminate T6-9 compression fractures. PTA inhalers have been switched to nebs with hope of increased ease of use and efficacy. She has been treated for acute bronchitis with a Z-pack x2. She has also experienced some hallucinations as well as increase in her baseline anxiety. Trial of decrease in her PTA alprazolam was not successful.     Today, Ms. Brandon is seen in her room. Her main concern is left groin pain that she said started acutely on Wednesday. She showed me a large ecchymoses on her inner left thigh from her knee to her groin. Cannot tell me what precipitated the pain, saying it \"came on all of a sudden\". States she did not fall. No chest pain. Breathing at baseline on 3L supplemental O2. Also tried to describe a \"hernia\" involving her stomach and diaphragm that was diagnosed with an EGD. When I called this a \"hiatal hernia\" she was insistent that was incorrect. She believes her stomach is \"churning\" more from this. Discussed a brief increase in her PPI and she declined.     CODE " STATUS:   CPR/Full code     ALLERGIES:     Allergies   Allergen Reactions     Bacitracin      Cipro [Benzyl Alcohol]      Codeine      Neomycin      Plavix [Clopidogrel Bisulfate]      Sulfa Drugs        PAST MEDICAL HISTORY:   Past Medical History:   Diagnosis Date     Abnormal Pap smear     greater than 40 years ago     Atrial fibrillation (H) 2008     Carotid artery bruit     left     COPD (chronic obstructive pulmonary disease) (H) 1992    on supplemental O2 since 4/06     Essential hypertension, benign 1999     GERD (GASTROESOPHAGEAL REFLUX DISEASE) 11/16/2010     High cholesterol      Hypercoagulable state (H) 1993     Irritable bowel      Osteoporoses     2002       PAST SURGICAL HISTORY:   Past Surgical History:   Procedure Laterality Date     C EARDRUM REVISION      left eardrum patch     C EXCISION SKIN OF NOSE  8-11-10    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, fully excised     TONSILLECTOMY & ADENOIDECTOMY  1950s       FAMILY HISTORY:   Family History   Problem Relation Age of Onset     Hypertension Mother      Cardiovascular Mother      Depression Mother      Gynecology Mother      Cancer - colorectal Father      CANCER Father      Hypertension Brother      Genitourinary Problems Brother      HEART DISEASE Brother      CANCER Brother      throat     Hypertension Son      Alcohol/Drug Son      Connective Tissue Disorder Son      Connective Tissue Disorder Daughter      Psychotic Disorder Brother      Hypertension Brother      Lipids Brother      C.A.D. Brother      Hypertension Sister      Alzheimer Disease Brother      Hypertension Sister      Lipids Sister      CEREBROVASCULAR DISEASE Sister      Lipids Sister      Respiratory Sister      Lipids Sister      Hypertension Sister        SOCIAL HISTORY:   Lives in [facility] facility     MEDICATIONS:  Current Outpatient Prescriptions   Medication Sig Dispense Refill     budesonide (PULMICORT) 0.25 MG/2ML neb solution Take 2 mLs (0.25 mg) by nebulization 2 times daily 2 [address]      predniSONE (DELTASONE) 10 MG tablet Take 1 tablet (10 mg) by mouth daily 31 tablet PRN     ipratropium - albuterol 0.5 mg/2.5 mg/3 mL (DUONEB) 0.5-2.5 (3) MG/3ML neb solution Take 1 vial (3 mLs) by nebulization 4 times daily Also give BID  mL 11     Trolamine Salicylate (ASPERCREME) 10 % LOTN Externally apply topically 3 times daily       furosemide (LASIX) 20 MG tablet Take 1 tablet (20 mg) by mouth daily 31 tablet PRN     Calcium Carb-Cholecalciferol (CALCIUM 600/VITAMIN D3) 600-800 MG-UNIT TABS Take 1 tablet by mouth 2 times daily 62 tablet PRN     citalopram (CELEXA) 20 MG tablet Take 1 tablet (20 mg) by mouth daily 31 tablet PRN     gabapentin (NEURONTIN) 300 MG capsule Take 1 capsule (300 mg) by mouth 2 times daily 62 capsule PRN     Alum hydroxide-Mag carbonate (GAVISCON EXTRA STRENGTH) 160-105 MG CHEW Take 1 chew tab by mouth 3 times daily AND MAY CHEW 2 TABS EVERY 4 HOURS AS NEEDED 240 tablet PRN     lisinopril (PRINIVIL/ZESTRIL) 2.5 MG tablet Take 1 tablet (2.5 mg) by mouth daily ON Monday, Wednesday, AND Friday 15 tablet PRN     loperamide (IMODIUM) 2 MG capsule Take 1 capsule (2 mg) by mouth daily 31 capsule PRN     metoprolol (TOPROL-XL) 25 MG 24 hr tablet Take 0.5 tablets (12.5 mg) by mouth daily AND MAY TAKE 12.5MG ONCE DAILY AS NEEDED 31 tablet PRN     simvastatin (ZOCOR) 20 MG tablet Take 1 tablet (20 mg) by mouth daily 31 tablet PRN     omeprazole (PRILOSEC) 20 MG CR capsule Take 1 capsule (20 mg) by mouth 2 times daily 62 capsule PRN     Potassium Chloride ER 20 MEQ TBCR Take 1 tablet (20 mEq) by mouth daily 31 tablet PRN     cyanocobalamin 1000 MCG TABS Take 1,000 mcg by mouth daily 31 tablet PRN     TRAMADOL HCL PO Take 25 mg by mouth 3 times daily as needed        ALPRAZOLAM PO Take 0.25 mg by mouth 3 times daily Also give 0.25mg po q4h prn       Warfarin Sodium (COUMADIN PO) Take by mouth daily Take as directed per INR       fluticasone (FLONASE) 50 MCG/ACT spray Spray 2 sprays into  both nostrils daily        Acetaminophen (TYLENOL PO) Take 1,000 mg by mouth every 6 hours as needed for mild pain or fever       METOLAZONE PO Take 2.5 mg by mouth Give 1 time weekly as needed for weight gain of 3 lbs or more       NITROGLYCERIN SL Place 0.4 mg under the tongue every 5 minutes as needed for chest pain       carboxymethylcellulose (REFRESH PLUS) 0.5 % SOLN ophthalmic solution Place 1-2 drops into both eyes every 4 hours as needed for dry eyes       sodium chloride (OCEAN) 0.65 % nasal spray Spray 1 spray into both nostrils as needed for congestion       albuterol (PROAIR HFA/PROVENTIL HFA/VENTOLIN HFA) 108 (90 BASE) MCG/ACT Inhaler Inhale 2 puffs into the lungs every 6 hours as needed        DULoxetine HCl (CYMBALTA PO) Take 20 mg by mouth daily          Post Discharge Medication Reconciliation Status: medication reconcilation previously completed during another office visit.    ROS:  10 point ROS neg other than the symptoms noted above in the HPI though limited reliability due to her cognitive impairment.     PHYSICAL EXAM:  /47  Pulse 97  Temp 98.3  F (36.8  C)  Resp 20  Wt 151 lb 8 oz (68.7 kg)  BMI 29.59 kg/m2  Gen: sitting on edge of bed, alert, cooperative and in no acute distress  HEENT: normocephalic; nasal cannula in place  Card: RRR, S1, S2, no murmurs  Resp: lungs diminished but clear to auscultation bilaterally, no crackles or wheezes  GI: abdomen soft, not-tender  MSK: normal muscle tone, no LE edema  Neuro: CX II-XII grossly in tact; ROM in all four extremities grossly in tact  Psych: alert and oriented to self and general situation; normal affect  Skin: large ecchymoses on posterior medial L thigh from knee to groin    LABORATORY/IMAGING DATA:  Reviewed as per Epic    ASSESSMENT/PLAN:    Acute Left Groin Pain  Acute onset yesterday. Posterior medial let thigh with large ecchymosis. She is on prednisone and has thoracic compression fractures so concern is for an  insufficiency fracture.   -- XR left hip 2 view    COPD  Chronic Hypoxic Respiratory Failure  Baseline 3 L O2. PTA inhalers switched to nebs with hope of increased ease of use and efficacy. Treated for acute bronchitis x2 since arriving at [facility]. Lungs diminished but clear on exam today.   -- continues on budesonide 0.25 mg/2mL nebs BID, DuoNebs QID and BID PRN and albuterol MDI PRN   -- continues on prednisone 10 mg daily    Chronic Atrial Fibrillation  -- rate controlled with metoprolol XL 12.5 mg daily   -- anticoagulated with warfarin, goal INR 2-3    CHF / HTN / HLD  Appears euvolemic on exam today.   -- continues on lisinopril 2.5 mg qMWF, furosemide 20 mg daily, metoprolol XL 12.5 mg daily and simvastatin 20 mg daily     Generalized Anxiety Disorder  Mild Major Depression  Failed GDR of alprazolam since arriving at facility. Has been tapered off duloxetine as also on citalopram.   -- continues on alprazolam 0.25 mg TID and 0.25 mg q4h PRN as well as citalopram 20 mg daily  -- supportive cares    Thoracic Compression Fractures  Left 7th Rib Fracture  On long-term prednisone due to COPD. No DEXA available for review.   -- continues on Ca+D supplement  -- analgesia with acetaminophen PRN and tramadol PRN    CKD, Stage IV  Baseline Cr appears to be in normal range with limited data  -- avoid nephrotoxic meds  -- q6 month BMP    Peripheral Neuropathy  -- continues on gabapentin 300 mg BID    GERD / Cleveland's Esophagus  -- continues on omeprazole 20 mg BID      Electronically signed by:  Liliana Freeman MD

## 2017-08-17 ENCOUNTER — TRANSFERRED RECORDS (OUTPATIENT)
Dept: HEALTH INFORMATION MANAGEMENT | Facility: CLINIC | Age: 77
End: 2017-08-17

## 2017-08-17 ENCOUNTER — ASSISTED LIVING VISIT (OUTPATIENT)
Dept: GERIATRICS | Facility: CLINIC | Age: 77
End: 2017-08-17
Payer: COMMERCIAL

## 2017-08-17 DIAGNOSIS — S22.32XD CLOSED FRACTURE OF ONE RIB OF LEFT SIDE WITH ROUTINE HEALING, SUBSEQUENT ENCOUNTER: ICD-10-CM

## 2017-08-17 DIAGNOSIS — F32.0 MILD MAJOR DEPRESSION (H): ICD-10-CM

## 2017-08-17 DIAGNOSIS — I50.9 CHRONIC CONGESTIVE HEART FAILURE, UNSPECIFIED CONGESTIVE HEART FAILURE TYPE: ICD-10-CM

## 2017-08-17 DIAGNOSIS — S22.000D COMPRESSION FRACTURE OF THORACIC VERTEBRA, WITH ROUTINE HEALING, SUBSEQUENT ENCOUNTER: ICD-10-CM

## 2017-08-17 DIAGNOSIS — E78.00 PURE HYPERCHOLESTEROLEMIA: ICD-10-CM

## 2017-08-17 DIAGNOSIS — F41.1 GAD (GENERALIZED ANXIETY DISORDER): ICD-10-CM

## 2017-08-17 DIAGNOSIS — I48.20 CHRONIC ATRIAL FIBRILLATION (H): ICD-10-CM

## 2017-08-17 DIAGNOSIS — R10.32 LEFT GROIN PAIN: ICD-10-CM

## 2017-08-17 DIAGNOSIS — K22.719 BARRETT'S ESOPHAGUS WITH DYSPLASIA: ICD-10-CM

## 2017-08-17 DIAGNOSIS — N18.4 CHRONIC KIDNEY DISEASE, STAGE IV (SEVERE) (H): ICD-10-CM

## 2017-08-17 DIAGNOSIS — J44.9 CHRONIC OBSTRUCTIVE PULMONARY DISEASE, UNSPECIFIED COPD TYPE (H): Primary | ICD-10-CM

## 2017-08-17 DIAGNOSIS — G62.9 PERIPHERAL POLYNEUROPATHY: ICD-10-CM

## 2017-08-17 DIAGNOSIS — J96.11 CHRONIC RESPIRATORY FAILURE WITH HYPOXIA (H): ICD-10-CM

## 2017-08-17 DIAGNOSIS — I10 BENIGN ESSENTIAL HYPERTENSION: ICD-10-CM

## 2017-08-17 PROCEDURE — 99207 ZZC CDG-CORRECTLY CODED, REVIEWED AND AGREE: CPT | Performed by: INTERNAL MEDICINE

## 2017-08-21 DIAGNOSIS — J44.9 CHRONIC OBSTRUCTIVE PULMONARY DISEASE, UNSPECIFIED COPD TYPE (H): Primary | ICD-10-CM

## 2017-08-22 RX ORDER — ALBUTEROL SULFATE 90 UG/1
2 AEROSOL, METERED RESPIRATORY (INHALATION) 4 TIMES DAILY
Qty: 1 INHALER
Start: 2017-08-22 | End: 2017-11-30

## 2017-08-23 NOTE — PROGRESS NOTES
[place] [facility]    HPI:    Estefania Brandon is a 76 year old  (1940), who is being seen today for an episodic care visit at [facility].   HPI information obtained from: facility chart records, facility staff, patient report and [facility] chart review. Today's concern is INR/Coumadin management for A. Fib    Bleeding Signs/Symptoms:  None  Thromboembolic Signs/Symptoms:  None    Medication Changes:  No  Dietary Changes:  No  Activity Changes: No  Bacterial/Viral Infection:  No    Missed Coumadin Doses:  None    On ASA: No    Other Concerns:  No    OBJECTIVE:    INR Today:  2.12  Current Dose:  2mg T/Th, 4mg ROW    ASSESSMENT:     Chronic atrial fibrillation (H)  Encounter for therapeutic drug monitoring  Long term (current) use of anticoagulants  Therapeutic INR for goal of 2-3    PLAN:    New Dose: No Change      Next INR: 9/20/17      IAN Maloney [facility]

## 2017-08-24 ENCOUNTER — ASSISTED LIVING VISIT (OUTPATIENT)
Dept: GERIATRICS | Facility: CLINIC | Age: 77
End: 2017-08-24
Payer: COMMERCIAL

## 2017-08-24 VITALS
HEART RATE: 97 BPM | SYSTOLIC BLOOD PRESSURE: 150 MMHG | WEIGHT: 151.5 LBS | RESPIRATION RATE: 20 BRPM | BODY MASS INDEX: 29.59 KG/M2 | TEMPERATURE: 98.3 F | DIASTOLIC BLOOD PRESSURE: 47 MMHG

## 2017-08-24 DIAGNOSIS — Z51.81 ENCOUNTER FOR THERAPEUTIC DRUG MONITORING: ICD-10-CM

## 2017-08-24 DIAGNOSIS — Z79.01 LONG TERM (CURRENT) USE OF ANTICOAGULANTS: ICD-10-CM

## 2017-08-24 DIAGNOSIS — I48.20 CHRONIC ATRIAL FIBRILLATION (H): Primary | ICD-10-CM

## 2017-09-19 VITALS
TEMPERATURE: 98.3 F | RESPIRATION RATE: 19 BRPM | HEART RATE: 96 BPM | BODY MASS INDEX: 29.59 KG/M2 | DIASTOLIC BLOOD PRESSURE: 66 MMHG | SYSTOLIC BLOOD PRESSURE: 121 MMHG | WEIGHT: 151.5 LBS

## 2017-09-19 NOTE — PROGRESS NOTES
[place] [facility]    HPI:    Estefania Brandon is a 76 year old  (1940), who is being seen today for an episodic care visit at [facility].   HPI information obtained from: facility chart records, facility staff, patient report and [facility] chart review. Today's concern is INR/Coumadin management for A. Fib    Bleeding Signs/Symptoms:  None  Thromboembolic Signs/Symptoms:  None    Medication Changes:  No  Dietary Changes:  No  Activity Changes: No  Bacterial/Viral Infection:  No    Missed Coumadin Doses:  None    On ASA: No    Other Concerns:  No    OBJECTIVE:    INR Today:  2.50  Current Dose:  2mg T/Th, 4mg ROW    ASSESSMENT:     Chronic atrial fibrillation (H)  Long term (current) use of anticoagulants  Encounter for therapeutic drug monitoring  Therapeutic INR for goal of 2-3    PLAN:    New Dose: 2mg MWF, 4mg ROW      Next INR: 10/18/17      IAN Maloney CNP  [facility]

## 2017-09-20 ENCOUNTER — TRANSFERRED RECORDS (OUTPATIENT)
Dept: HEALTH INFORMATION MANAGEMENT | Facility: CLINIC | Age: 77
End: 2017-09-20

## 2017-09-20 LAB — INR PPP: 2.5 (ref 0.9–1.1)

## 2017-09-21 ENCOUNTER — ASSISTED LIVING VISIT (OUTPATIENT)
Dept: GERIATRICS | Facility: CLINIC | Age: 77
End: 2017-09-21
Payer: COMMERCIAL

## 2017-09-21 DIAGNOSIS — Z79.01 LONG TERM (CURRENT) USE OF ANTICOAGULANTS: ICD-10-CM

## 2017-09-21 DIAGNOSIS — Z51.81 ENCOUNTER FOR THERAPEUTIC DRUG MONITORING: ICD-10-CM

## 2017-09-21 DIAGNOSIS — I48.20 CHRONIC ATRIAL FIBRILLATION (H): Primary | ICD-10-CM

## 2017-10-18 ENCOUNTER — TRANSFERRED RECORDS (OUTPATIENT)
Dept: HEALTH INFORMATION MANAGEMENT | Facility: CLINIC | Age: 77
End: 2017-10-18

## 2017-10-18 LAB — INR PPP: 2.27 (ref 0.9–1.1)

## 2017-10-18 NOTE — PROGRESS NOTES
[place] [facility]    HPI:    Estefania Brandon is a 76 year old  (1940), who is being seen today for an episodic care visit at [facility].   HPI information obtained from: facility chart records, facility staff, patient report and [facility] chart review. Today's concern is INR/Coumadin management for A. Fib    Bleeding Signs/Symptoms:  None  Thromboembolic Signs/Symptoms:  None    Medication Changes:  No  Dietary Changes:  No  Activity Changes: No  Bacterial/Viral Infection:  No    Missed Coumadin Doses:  None    On ASA: No    Other Concerns:  No    OBJECTIVE:    INR Today:  2.27 on 10/18/17   Current Dose:  2mg po MWF ; 4mg po all other days.     ASSESSMENT:     Chronic atrial fibrillation (H)  Long term current use of anticoagulant therapy  Encounter for therapeutic drug monitoring  Therapeutic INR for goal of 2-3    PLAN:    New Dose: No Change      Next INR: Wed 11/15/17      IAN Maloney CNP  [facility]

## 2017-10-19 ENCOUNTER — ASSISTED LIVING VISIT (OUTPATIENT)
Dept: GERIATRICS | Facility: CLINIC | Age: 77
End: 2017-10-19
Payer: COMMERCIAL

## 2017-10-19 VITALS
RESPIRATION RATE: 17 BRPM | DIASTOLIC BLOOD PRESSURE: 67 MMHG | BODY MASS INDEX: 30 KG/M2 | WEIGHT: 153.6 LBS | SYSTOLIC BLOOD PRESSURE: 116 MMHG | HEART RATE: 86 BPM | TEMPERATURE: 97.5 F

## 2017-10-19 DIAGNOSIS — I48.20 CHRONIC ATRIAL FIBRILLATION (H): Primary | ICD-10-CM

## 2017-10-19 DIAGNOSIS — Z51.81 ENCOUNTER FOR THERAPEUTIC DRUG MONITORING: ICD-10-CM

## 2017-10-19 DIAGNOSIS — Z79.01 LONG TERM CURRENT USE OF ANTICOAGULANT THERAPY: ICD-10-CM

## 2017-10-19 RX ORDER — TRIAMCINOLONE ACETONIDE 1 MG/G
CREAM TOPICAL 2 TIMES DAILY PRN
COMMUNITY
End: 2018-01-01

## 2017-10-27 ENCOUNTER — TRANSFERRED RECORDS (OUTPATIENT)
Dept: HEALTH INFORMATION MANAGEMENT | Facility: CLINIC | Age: 77
End: 2017-10-27

## 2017-10-27 ENCOUNTER — TELEPHONE (OUTPATIENT)
Dept: GERIATRICS | Facility: CLINIC | Age: 77
End: 2017-10-27

## 2017-10-27 LAB
ANION GAP SERPL CALCULATED.3IONS-SCNC: 6 MMOL/L (ref 5–18)
BUN SERPL-MCNC: 16 MG/DL (ref 8–28)
CALCIUM SERPL-MCNC: 9.1 MG/DL (ref 8.5–10.5)
CHLORIDE SERPLBLD-SCNC: 99 MMOL/L (ref 98–107)
CO2 SERPL-SCNC: 35 MMOL/L (ref 22–31)
CREAT SERPL-MCNC: 0.96 MG/DL (ref 0.6–1.1)
ERYTHROCYTE [DISTWIDTH] IN BLOOD BY AUTOMATED COUNT: 12.4 % (ref 11–14.5)
GFR SERPL CREATININE-BSD FRML MDRD: 56 ML/MIN/1.73M2
GLUCOSE SERPL-MCNC: 84 MG/DL (ref 70–125)
HCT VFR BLD AUTO: 38.4 % (ref 35–47)
HEMOGLOBIN: 12.1 G/DL (ref 12–16)
INR PPP: 2.09 (ref 0.9–1.1)
MCH RBC QN AUTO: 32.4 PG (ref 27–34)
MCHC RBC AUTO-ENTMCNC: 31.5 G/DL (ref 32–36)
MCV RBC AUTO: 103 FL (ref 80–100)
PLATELET # BLD AUTO: 156 THOU/UL (ref 140–440)
POTASSIUM SERPL-SCNC: 4.2 MMOL/L (ref 3.5–5)
RBC # BLD AUTO: 3.73 MILL/UL (ref 3.6–5.4)
SODIUM SERPL-SCNC: 140 MMOL/L (ref 136–145)
WBC # BLD AUTO: 6.4 THOU/UL (ref 4–11)

## 2017-10-27 NOTE — TELEPHONE ENCOUNTER
[place] [facility] TELEPHONE ENCOUNTER    Chief Complaint   Patient presents with     allergy to medication       Estefania Brandon is a 76 year old  (1940),Nurse called today to report: yesterday I ordered Augmentin [facility] for COPD exacerbation.  Pharmacy discovered patient is allergic to Amoxicillin but sent the medication anyway.  She has not received any of the medication    ASSESSMENT/PLAN  COPD exacerbation    Discontinue Augmentin    Add to allergy list    Start Doxycycline 100 mg BID x 7 days    Addis Mas, APRN CNP

## 2017-11-01 ENCOUNTER — TRANSFERRED RECORDS (OUTPATIENT)
Dept: HEALTH INFORMATION MANAGEMENT | Facility: CLINIC | Age: 77
End: 2017-11-01

## 2017-11-01 LAB — INR PPP: 2.45 (ref 0.9–1.1)

## 2017-11-02 ENCOUNTER — ASSISTED LIVING VISIT (OUTPATIENT)
Dept: GERIATRICS | Facility: CLINIC | Age: 77
End: 2017-11-02
Payer: COMMERCIAL

## 2017-11-02 VITALS
RESPIRATION RATE: 18 BRPM | DIASTOLIC BLOOD PRESSURE: 61 MMHG | TEMPERATURE: 97.8 F | HEART RATE: 91 BPM | BODY MASS INDEX: 29.29 KG/M2 | SYSTOLIC BLOOD PRESSURE: 105 MMHG | WEIGHT: 150 LBS

## 2017-11-02 DIAGNOSIS — Z79.01 LONG TERM CURRENT USE OF ANTICOAGULANT THERAPY: ICD-10-CM

## 2017-11-02 DIAGNOSIS — K22.719 BARRETT'S ESOPHAGUS WITH DYSPLASIA: ICD-10-CM

## 2017-11-02 DIAGNOSIS — I48.20 CHRONIC ATRIAL FIBRILLATION (H): ICD-10-CM

## 2017-11-02 DIAGNOSIS — Z51.81 ENCOUNTER FOR THERAPEUTIC DRUG MONITORING: ICD-10-CM

## 2017-11-02 DIAGNOSIS — J44.1 COPD EXACERBATION (H): Primary | ICD-10-CM

## 2017-11-02 DIAGNOSIS — K21.9 GASTROESOPHAGEAL REFLUX DISEASE, ESOPHAGITIS PRESENCE NOT SPECIFIED: ICD-10-CM

## 2017-11-09 ENCOUNTER — ASSISTED LIVING VISIT (OUTPATIENT)
Dept: GERIATRICS | Facility: CLINIC | Age: 77
End: 2017-11-09
Payer: COMMERCIAL

## 2017-11-09 VITALS
HEART RATE: 91 BPM | BODY MASS INDEX: 29.29 KG/M2 | SYSTOLIC BLOOD PRESSURE: 105 MMHG | TEMPERATURE: 97.8 F | RESPIRATION RATE: 18 BRPM | DIASTOLIC BLOOD PRESSURE: 61 MMHG | WEIGHT: 150 LBS

## 2017-11-09 DIAGNOSIS — F33.0 MILD RECURRENT MAJOR DEPRESSION (H): ICD-10-CM

## 2017-11-09 DIAGNOSIS — F43.22 ADJUSTMENT DISORDER WITH ANXIETY: ICD-10-CM

## 2017-11-09 DIAGNOSIS — I10 ESSENTIAL HYPERTENSION: Primary | ICD-10-CM

## 2017-11-09 DIAGNOSIS — K22.719 BARRETT'S ESOPHAGUS WITH DYSPLASIA: ICD-10-CM

## 2017-11-09 DIAGNOSIS — I48.91 ATRIAL FIBRILLATION, UNSPECIFIED TYPE (H): ICD-10-CM

## 2017-11-09 DIAGNOSIS — K21.9 GASTROESOPHAGEAL REFLUX DISEASE WITHOUT ESOPHAGITIS: ICD-10-CM

## 2017-11-09 NOTE — PROGRESS NOTES
[facility]    Chief Complaint   Patient presents with     RECHECK       HPI:    Estefania Brandon is a 76 year old  (1940), who is being seen today for an episodic care visit at [facility] .  HPI information obtained from: facility chart records, facility staff, patient report and [facility] chart review.      Today's concern is:     Essential hypertension  Atrial fibrillation, unspecified type (H)  Gastroesophageal reflux disease without esophagitis  Cleveland's esophagus with dysplasia  Adjustment disorder with anxiety  Mild recurrent major depression (H)     Patient met in room today on request, states having continued LUQ pain, wants to have evaluated by GI for potential hernia, denies other CP/palpitations, I&O appropriate, continues to have certain elevated level of anxiety with health status and any changes/pain present, today have no acute concerns but will re-refer to GI for evaluation.    ALLERGIES: Amoxicillin; Bacitracin; Cipro [benzyl alcohol]; Codeine; Neomycin; Plavix [clopidogrel bisulfate]; and Sulfa drugs  Past Medical, Surgical, Family and Social History reviewed and updated in [facility].    Current Outpatient Prescriptions   Medication Sig Dispense Refill     Alum hydroxide-Mag carbonate (GAVISCON EXTRA STRENGTH) 160-105 MG CHEW Take 1 chew tab by mouth 3 times daily       triamcinolone (KENALOG) 0.1 % cream Apply topically 2 times daily as needed for irritation       albuterol (PROAIR HFA/PROVENTIL HFA/VENTOLIN HFA) 108 (90 BASE) MCG/ACT Inhaler Inhale 2 puffs into the lungs 4 times daily PRNF COPD 1 Inhaler PRN     budesonide (PULMICORT) 0.25 MG/2ML neb solution Take 2 mLs (0.25 mg) by nebulization 2 times daily 2 Box 98     predniSONE (DELTASONE) 10 MG tablet Take 1 tablet (10 mg) by mouth daily 31 tablet PRN     ipratropium - albuterol 0.5 mg/2.5 mg/3 mL (DUONEB) 0.5-2.5 (3) MG/3ML neb solution Take 1 vial (3 mLs) by nebulization 4 times daily Also give BID PRN  360 mL 11     Trolamine Salicylate (ASPERCREME) 10 % LOTN Externally apply topically 3 times daily       furosemide (LASIX) 20 MG tablet Take 1 tablet (20 mg) by mouth daily 31 tablet PRN     Calcium Carb-Cholecalciferol (CALCIUM 600/VITAMIN D3) 600-800 MG-UNIT TABS Take 1 tablet by mouth 2 times daily 62 tablet PRN     citalopram (CELEXA) 20 MG tablet Take 1 tablet (20 mg) by mouth daily 31 tablet PRN     gabapentin (NEURONTIN) 300 MG capsule Take 1 capsule (300 mg) by mouth 2 times daily 62 capsule PRN     loperamide (IMODIUM) 2 MG capsule Take 1 capsule (2 mg) by mouth daily 31 capsule PRN     simvastatin (ZOCOR) 20 MG tablet Take 1 tablet (20 mg) by mouth daily 31 tablet PRN     omeprazole (PRILOSEC) 20 MG CR capsule Take 1 capsule (20 mg) by mouth 2 times daily 62 capsule PRN     Potassium Chloride ER 20 MEQ TBCR Take 1 tablet (20 mEq) by mouth daily 31 tablet PRN     cyanocobalamin 1000 MCG TABS Take 1,000 mcg by mouth daily 31 tablet PRN     TRAMADOL HCL PO Take 25 mg by mouth 3 times daily as needed        ALPRAZOLAM PO Take 0.25 mg by mouth 3 times daily Also give 0.25mg po q4h prn       Warfarin Sodium (COUMADIN PO) Take by mouth daily Take as directed per INR       fluticasone (FLONASE) 50 MCG/ACT spray Spray 2 sprays into both nostrils daily        Acetaminophen (TYLENOL PO) Take 1,000 mg by mouth every 6 hours as needed for mild pain or fever       NITROGLYCERIN SL Place 0.4 mg under the tongue every 5 minutes as needed for chest pain       carboxymethylcellulose (REFRESH PLUS) 0.5 % SOLN ophthalmic solution Place 1-2 drops into both eyes every 4 hours as needed for dry eyes       sodium chloride (OCEAN) 0.65 % nasal spray Spray 1 spray into both nostrils as needed for congestion       Medications reviewed:  Medications reconciled to facility chart and changes were made to reflect current medications as identified as above med list. Below are the changes that were made:   Medications stopped since last  EPIC medication reconciliation:   Medications Discontinued During This Encounter   Medication Reason     Alum hydroxide-Mag carbonate (GAVISCON EXTRA STRENGTH) 160-105 MG CHEW Dose adjustment       Medications started since last [facility] medication reconciliation:  Orders Placed This Encounter   Medications     Alum hydroxide-Mag carbonate (GAVISCON EXTRA STRENGTH) 160-105 MG CHEW     Sig: Take 1 chew tab by mouth 3 times daily       REVIEW OF SYSTEMS:  4 point ROS including Respiratory, CV, GI and , other than that noted in the HPI,  is negative    Physical Exam:  /61  Pulse 91  Temp 97.8  F (36.6  C)  Resp 18  Wt 150 lb (68 kg)  BMI 29.29 kg/m2  GENERAL APPEARANCE:  in no distress, appears healthy  ENT:  Mouth and posterior oropharynx normal, moist mucous membranes  RESP:  lungs clear to auscultation , no respiratory distress  CV:  regular rate and rhythm, no murmur, rub, or gallop, no edema  ABDOMEN:  no guarding or rebound, bowel sounds normal  M/S:   Gait and station abnormal weak, requires walker/WC  SKIN:  Inspection of skin and subcutaneous tissue baseline  NEURO:   Examination of sensation by touch normal  PSYCH:  oriented to self/time, affect and mood normal    Recent Labs:      CBC RESULTS:   Recent Labs   Lab Test 10/27/17 04/12/17   WBC  6.4  6.0   RBC  3.73  3.77*   HGB  12.1  12.2   HCT  38.4  38.0   MCV  103*  101*   MCH  32.4  32.4   MCHC  31.5*  32.1   RDW  12.4  12.6   PLT  156  165       Last Basic Metabolic Panel:  Recent Labs   Lab Test 10/27/17 04/12/17   NA  140  142   POTASSIUM  4.2  4.1   CHLORIDE  99  98   JUANITA  9.1  9.1   CO2  35*  34*   BUN  16  20   CR  0.96  0.86   GLC  84  88       Liver Function Studies -   Recent Labs   Lab Test  11/12/10   1053  03/17/10   1219  09/22/09   1326   ALBUMIN   --    --   3.7   AST  30  24   --    ALT  38  25   --        TSH   Date Value Ref Range Status   04/12/2017 1.05 0.30 - 5.00 uIU/mL Final   11/12/2010 1.05 0.4 - 5.0 mU/L Final       Lab  Results   Component Value Date    INR 2.45 11/01/2017    INR 2.09 10/27/2017    INR 2.27 10/18/2017    INR 2.50 09/20/2017    INR 2.26 08/02/2017     Lab Results   Component Value Date    A1C 5.8 11/12/2010             Assessment/Plan:  Essential hypertension  -SBP's have been in the 100-120 range, this am checked and was 90/56 with HR 72  -recently D/C'd metoprolol, will D/C lisinopril 2.5mg MWF dosing today  -facility to continue to monitor VS as scheduled    Atrial fibrillation, unspecified type (H)  -INR was 2.45; therapeutic  -warfarin 2mg MWF, 4mg ROW, recheck INR on 11/15/17    Gastroesophageal reflux disease without esophagitis  Moffett's esophagus with dysplasia  -patient has continued report of chronic pain in LUQ, requesting evaluation by GI  -of note did have imaging approximately 2 years ago with positive polyps, moffett's, and paraesophageal hernia  -referral sent to GI on 7/20, again on 11/2; unsure if patient received call or answered phone  -will re-refer to GI for evaluation today, contact will be Brenda Burris at [phone]  Order details:  Preferred Location: [facility] [phone]  *Contact is daughter Brenda Burris, [phone], please call within 7 days to make appt.  Thank you    Adjustment disorder with anxiety  Mild recurrent major depression (H)  -chronic condition, very aware of any health status changes  -continue citalopram 20mg Qd, alprazolam 0.25mg  TID plus PRN  -continue with current regimen and follow up by facility    Family communications: spoke with beka daniels via phone, agrees with plan above, supports medication reconciliation and reduction of polypharmacy as possible, expects contact from GI regarding referral for evaluation.      Electronically signed by  IAN Maloney CNP  [facility]

## 2017-11-15 VITALS
DIASTOLIC BLOOD PRESSURE: 61 MMHG | HEART RATE: 91 BPM | WEIGHT: 150 LBS | RESPIRATION RATE: 18 BRPM | SYSTOLIC BLOOD PRESSURE: 105 MMHG | BODY MASS INDEX: 29.29 KG/M2 | TEMPERATURE: 97.8 F

## 2017-11-15 NOTE — PROGRESS NOTES
[facility]    HPI:    Estefania Brandon is a 77 year old  (1940), who is being seen today for an episodic care visit at [facility].   HPI information obtained from: facility chart records, facility staff, patient report and [facility] chart review. Today's concern is INR/Coumadin management for A. Fib    Bleeding Signs/Symptoms:  None  Thromboembolic Signs/Symptoms:  None    Medication Changes:  No  Dietary Changes:  No  Activity Changes: No  Bacterial/Viral Infection:  No    Missed Coumadin Doses:  None    On ASA: No    Other Concerns:  No    OBJECTIVE:    INR Today:  2.22  Current Dose:  2mg MWF ; 4mg ROW    ASSESSMENT:     Atrial fibrillation, unspecified type (H)  Encounter for therapeutic drug monitoring  Long term current use of anticoagulant therapy  Therapeutic INR for goal of 2-3    PLAN:    New Dose: No Change      Next INR: Wed 12/13/17      IAN Maloney CNP  [facility]           yes

## 2017-11-16 ENCOUNTER — TRANSFERRED RECORDS (OUTPATIENT)
Dept: HEALTH INFORMATION MANAGEMENT | Facility: CLINIC | Age: 77
End: 2017-11-16

## 2017-11-16 ENCOUNTER — ASSISTED LIVING VISIT (OUTPATIENT)
Dept: GERIATRICS | Facility: CLINIC | Age: 77
End: 2017-11-16
Payer: COMMERCIAL

## 2017-11-16 DIAGNOSIS — Z79.01 LONG TERM CURRENT USE OF ANTICOAGULANT THERAPY: ICD-10-CM

## 2017-11-16 DIAGNOSIS — Z51.81 ENCOUNTER FOR THERAPEUTIC DRUG MONITORING: ICD-10-CM

## 2017-11-16 DIAGNOSIS — I48.91 ATRIAL FIBRILLATION, UNSPECIFIED TYPE (H): Primary | ICD-10-CM

## 2017-11-16 LAB — INR PPP: 2.22 (ref 0.9–1.1)

## 2017-11-27 ENCOUNTER — CARE COORDINATION (OUTPATIENT)
Dept: GERIATRIC MEDICINE | Facility: CLINIC | Age: 77
End: 2017-11-27

## 2017-11-27 NOTE — PROGRESS NOTES
[facility] Six-Month Telephone Assessment    6 month telephone assessment completed on 11/27/17.    ER visits:.No        Hospitalizations: No}  TCU stays: No   Significant health status changes: health has improved  Falls/Injuries: No  ADL/IADL changes: No    Changes in services:  Continues to live in an assisted living facility.    Goals: See POC  for goal progress documentation.      will see client in 6 months for an annual health risk assessment.   Encouraged client to call CM with any questions or concerns in the meantime.     Chantelle Cardona RN PHN  [facility] Case Management  [phone]

## 2017-11-29 NOTE — PROGRESS NOTES
"[place] [facility]    Chief Complaint   Patient presents with     RECHECK       HPI:    Estefania Brandon is a 77 year old  (1940), who is being seen today for an episodic care visit at [facility] .  HPI information obtained from: facility chart records, facility staff, patient report and [facility] chart review.      Today's concern is:  SOB (shortness of breath)  Chronic obstructive pulmonary disease, unspecified COPD type (H)  Oxygen increased to 3.5m liters on 11/24, stable with no acute concerns.    Decreased energy  Weights:  11/1   150lb  10/1   153.6lb  7/5     151.5lb    SOB (shortness of breath)  See above    Decreased energy  Adjustment disorder with anxiety  Chronic anxiety issues, moderately controlled with alprazolam, is semi-hypochondriac per daughter    Cleveland's esophagus with dysplasia  Gastroesophageal reflux disease, esophagitis presence not specified  Continues having anxiety regarding a potential hernia in LUQ and is requesting evaluation by GI as she had \"a few\" years ago to follow up.       ALLERGIES: Amoxicillin; Bacitracin; Cipro [benzyl alcohol]; Codeine; Neomycin; Plavix [clopidogrel bisulfate]; and Sulfa drugs  Past Medical, Surgical, Family and Social History reviewed and updated in [facility].    Current Outpatient Prescriptions   Medication Sig Dispense Refill     albuterol (PROAIR HFA/PROVENTIL HFA/VENTOLIN HFA) 108 (90 BASE) MCG/ACT Inhaler Inhale 2 puffs into the lungs 6 times daily PRNF COPD 1 Inhaler PRN     Alum hydroxide-Mag carbonate (GAVISCON EXTRA STRENGTH) 160-105 MG CHEW Take 1 chew tab by mouth 3 times daily       triamcinolone (KENALOG) 0.1 % cream Apply topically 2 times daily as needed for irritation       budesonide (PULMICORT) 0.25 MG/2ML neb solution Take 2 mLs (0.25 mg) by nebulization 2 times daily 2 Box 98     predniSONE (DELTASONE) 10 MG tablet Take 1 tablet (10 mg) by mouth daily 31 tablet PRN     ipratropium - albuterol 0.5 mg/2.5 mg/3 " mL (DUONEB) 0.5-2.5 (3) MG/3ML neb solution Take 1 vial (3 mLs) by nebulization 4 times daily Also give BID  mL 11     Trolamine Salicylate (ASPERCREME) 10 % LOTN Externally apply topically 3 times daily       furosemide (LASIX) 20 MG tablet Take 1 tablet (20 mg) by mouth daily 31 tablet PRN     Calcium Carb-Cholecalciferol (CALCIUM 600/VITAMIN D3) 600-800 MG-UNIT TABS Take 1 tablet by mouth 2 times daily 62 tablet PRN     citalopram (CELEXA) 20 MG tablet Take 1 tablet (20 mg) by mouth daily 31 tablet PRN     gabapentin (NEURONTIN) 300 MG capsule Take 1 capsule (300 mg) by mouth 2 times daily 62 capsule PRN     loperamide (IMODIUM) 2 MG capsule Take 1 capsule (2 mg) by mouth daily 31 capsule PRN     simvastatin (ZOCOR) 20 MG tablet Take 1 tablet (20 mg) by mouth daily 31 tablet PRN     omeprazole (PRILOSEC) 20 MG CR capsule Take 1 capsule (20 mg) by mouth 2 times daily 62 capsule PRN     Potassium Chloride ER 20 MEQ TBCR Take 1 tablet (20 mEq) by mouth daily 31 tablet PRN     cyanocobalamin 1000 MCG TABS Take 1,000 mcg by mouth daily 31 tablet PRN     TRAMADOL HCL PO Take 25 mg by mouth 3 times daily as needed        ALPRAZOLAM PO Take 0.25 mg by mouth 3 times daily Also give 0.25mg po q4h prn       Warfarin Sodium (COUMADIN PO) Take by mouth daily Take as directed per INR       fluticasone (FLONASE) 50 MCG/ACT spray Spray 2 sprays into both nostrils daily        Acetaminophen (TYLENOL PO) Take 1,000 mg by mouth every 6 hours as needed for mild pain or fever       NITROGLYCERIN SL Place 0.4 mg under the tongue every 5 minutes as needed for chest pain       carboxymethylcellulose (REFRESH PLUS) 0.5 % SOLN ophthalmic solution Place 1-2 drops into both eyes every 4 hours as needed for dry eyes       sodium chloride (OCEAN) 0.65 % nasal spray Spray 1 spray into both nostrils as needed for congestion       [DISCONTINUED] albuterol (PROAIR HFA/PROVENTIL HFA/VENTOLIN HFA) 108 (90 BASE) MCG/ACT Inhaler Inhale 2 puffs  into the lungs 4 times daily PRNF COPD 1 Inhaler PRN     Medications reviewed:  Medications reconciled to facility chart and changes were made to reflect current medications as identified as above med list. Below are the changes that were made:   Medications stopped since last EPIC medication reconciliation:   There are no discontinued medications.    Medications started since last [facility] medication reconciliation:  No orders of the defined types were placed in this encounter.      REVIEW OF SYSTEMS:  4 point ROS including Respiratory, CV, GI and , other than that noted in the HPI,  is negative    Physical Exam:  /61  Pulse 91  Temp 97.8  F (36.6  C)  Resp 18  Wt 150 lb (68 kg)  BMI 29.29 kg/m2  GENERAL APPEARANCE:  Alert, in no distress, appears healthy  ENT:  Mouth and posterior oropharynx normal, moist mucous membranes  RESP:  respiratory effort and palpation of chest normal, lungs clear to auscultation   CV:  regular rate and rhythm, no murmur, rub, or gallop, no edema  ABDOMEN:  bowel sounds normal  M/S:   Gait and station abnormal weak, requires walker, has EWC  SKIN:  Inspection of skin and subcutaneous tissue baseline  NEURO:   Examination of sensation by touch normal  PSYCH:  oriented to self    Recent Labs:      CBC RESULTS:   Recent Labs   Lab Test 10/27/17 04/12/17   WBC  6.4  6.0   RBC  3.73  3.77*   HGB  12.1  12.2   HCT  38.4  38.0   MCV  103*  101*   MCH  32.4  32.4   MCHC  31.5*  32.1   RDW  12.4  12.6   PLT  156  165       Last Basic Metabolic Panel:  Recent Labs   Lab Test 10/27/17 04/12/17   NA  140  142   POTASSIUM  4.2  4.1   CHLORIDE  99  98   JUANITA  9.1  9.1   CO2  35*  34*   BUN  16  20   CR  0.96  0.86   GLC  84  88       Liver Function Studies -   Recent Labs   Lab Test  11/12/10   1053  03/17/10   1219  09/22/09   1326   ALBUMIN   --    --   3.7   AST  30  24   --    ALT  38  25   --        TSH   Date Value Ref Range Status   04/12/2017 1.05 0.30 - 5.00 uIU/mL Final   11/12/2010  "1.05 0.4 - 5.0 mU/L Final     Lab Results   Component Value Date    INR 2.22 11/16/2017    INR 2.45 11/01/2017    INR 2.09 10/27/2017    INR 2.27 10/18/2017    INR 2.50 09/20/2017    INR 2.26 08/02/2017    INR 2.14 04/12/2017       Lab Results   Component Value Date    A1C 5.8 11/12/2010         Assessment/Plan:  SOB (shortness of breath)  Chronic obstructive pulmonary disease, unspecified COPD type (H)  -patient is very anxious over ability to breath, has been taking increasing amounts of nebulizers recently  -will increase duoneb to 6x/day per request, patient states being well aware that increased dosing may lead to side effects and limited improvement in pulmonary status, states she will decline neb if not needed, just wants to have available \"in case\"  -increased O2 use over past weeks, now at 3-3.5L/min  -no acute concerns today, pulmonary status stable    Decreased energy  Adjustment disorder with anxiety  -patient report of weakness, mild/moderate anxiety over health conditions is chronic issue, very focused on below Dx's  -continue citalopram 20mg Qd, alprazolam 0.25mg TID  -if anxiety levels have not improved in next few months will consider increasing alprazolam dosing in effort to provide improved control    Gastroesophageal reflux disease without esophagitis  Moffett's esophagus with dysplasia  -patient has continued report of chronic pain in LUQ, requesting evaluation by GI  -of note did have imaging approximately 2 years ago with positive polyps, moffett's, and paraesophageal hernia  -referral sent to GI on 7/20, again on 11/2 again on 11/9, unsure if patient received call or answered phone  -will re-refer to GI for evaluation today, contact will be Brenda Burris at [phone]  Order details:  Preferred Location: either [facility] [phone] or [facility] [phone]  *Contact is daughter Brenda Burris, [phone], please call within 7 days to make appt.   -Confirm GI contact with family and appt " date/time with PCP in order to follow up.  Also sent order to Dr. Hansen to co-sign if needed.      Electronically signed by  IAN Maloney CNP  [facility]

## 2017-11-30 ENCOUNTER — ASSISTED LIVING VISIT (OUTPATIENT)
Dept: GERIATRICS | Facility: CLINIC | Age: 77
End: 2017-11-30
Payer: COMMERCIAL

## 2017-11-30 VITALS
BODY MASS INDEX: 29.29 KG/M2 | RESPIRATION RATE: 18 BRPM | HEART RATE: 91 BPM | DIASTOLIC BLOOD PRESSURE: 61 MMHG | WEIGHT: 150 LBS | TEMPERATURE: 97.8 F | SYSTOLIC BLOOD PRESSURE: 105 MMHG

## 2017-11-30 DIAGNOSIS — R53.83 DECREASED ENERGY: ICD-10-CM

## 2017-11-30 DIAGNOSIS — K21.9 GASTROESOPHAGEAL REFLUX DISEASE, ESOPHAGITIS PRESENCE NOT SPECIFIED: ICD-10-CM

## 2017-11-30 DIAGNOSIS — F43.22 ADJUSTMENT DISORDER WITH ANXIETY: ICD-10-CM

## 2017-11-30 DIAGNOSIS — J44.9 CHRONIC OBSTRUCTIVE PULMONARY DISEASE, UNSPECIFIED COPD TYPE (H): ICD-10-CM

## 2017-11-30 DIAGNOSIS — K22.719 BARRETT'S ESOPHAGUS WITH DYSPLASIA: ICD-10-CM

## 2017-11-30 DIAGNOSIS — R06.02 SOB (SHORTNESS OF BREATH): Primary | ICD-10-CM

## 2017-11-30 RX ORDER — ALBUTEROL SULFATE 90 UG/1
2 AEROSOL, METERED RESPIRATORY (INHALATION)
Qty: 1 INHALER
Start: 2017-11-30

## 2017-12-12 NOTE — PROGRESS NOTES
[place] [facility]    HPI:    Estefania Brandon is a 77 year old  (1940), who is being seen today for an episodic care visit at [facility].   HPI information obtained from: facility chart records, facility staff, patient report and [facility] chart review. Today's concern is INR/Coumadin management for A. Fib    Bleeding Signs/Symptoms:  None  Thromboembolic Signs/Symptoms:  None    Medication Changes:  No  Dietary Changes:  No  Activity Changes: No  Bacterial/Viral Infection:  No    Missed Coumadin Doses:  None    On ASA: No    Other Concerns:  No    OBJECTIVE:    INR Today:  1.90  Current Dose:  2mg MWF, 4mg ROW    ASSESSMENT:     Chronic atrial fibrillation (H)  Long term current use of anticoagulant therapy  Encounter for therapeutic drug monitoring  Subtherapeutic INR for goal of 2-3    PLAN:    New Dose: 2mg T/Th, 4mg ROW      Next INR: 1/10/18    Of Note:  DENISE SMITH contacted daughter Brenda, left message, unable to reach patient as VM was full, Brenda called back, plan is to find transport first then she will contact DENISE SMITH for appt time and date.  Also, VitaD level was 58.9 today, WNL.    IAN Maloney CNP  [facility]

## 2018-01-01 ENCOUNTER — CARE COORDINATION (OUTPATIENT)
Dept: GERIATRIC MEDICINE | Facility: CLINIC | Age: 78
End: 2018-01-01

## 2018-01-01 ENCOUNTER — PATIENT OUTREACH (OUTPATIENT)
Dept: GERIATRIC MEDICINE | Facility: CLINIC | Age: 78
End: 2018-01-01

## 2018-01-01 ENCOUNTER — TELEPHONE (OUTPATIENT)
Dept: GERIATRIC MEDICINE | Facility: CLINIC | Age: 78
End: 2018-01-01

## 2018-01-01 ENCOUNTER — NURSING HOME VISIT (OUTPATIENT)
Dept: GERIATRICS | Facility: CLINIC | Age: 78
End: 2018-01-01
Payer: MEDICARE

## 2018-01-01 ENCOUNTER — ASSISTED LIVING VISIT (OUTPATIENT)
Dept: GERIATRICS | Facility: CLINIC | Age: 78
End: 2018-01-01
Payer: COMMERCIAL

## 2018-01-01 ENCOUNTER — RECORDS - HEALTHEAST (OUTPATIENT)
Dept: LAB | Facility: CLINIC | Age: 78
End: 2018-01-01

## 2018-01-01 ENCOUNTER — DOCUMENTATION ONLY (OUTPATIENT)
Dept: OTHER | Facility: CLINIC | Age: 78
End: 2018-01-01

## 2018-01-01 ENCOUNTER — TRANSFERRED RECORDS (OUTPATIENT)
Dept: HEALTH INFORMATION MANAGEMENT | Facility: CLINIC | Age: 78
End: 2018-01-01

## 2018-01-01 VITALS
DIASTOLIC BLOOD PRESSURE: 80 MMHG | RESPIRATION RATE: 20 BRPM | SYSTOLIC BLOOD PRESSURE: 142 MMHG | OXYGEN SATURATION: 96 % | HEART RATE: 100 BPM | TEMPERATURE: 98 F

## 2018-01-01 VITALS
SYSTOLIC BLOOD PRESSURE: 133 MMHG | HEART RATE: 98 BPM | WEIGHT: 150.6 LBS | BODY MASS INDEX: 29.41 KG/M2 | DIASTOLIC BLOOD PRESSURE: 74 MMHG | RESPIRATION RATE: 22 BRPM | TEMPERATURE: 99.9 F

## 2018-01-01 VITALS
TEMPERATURE: 98 F | SYSTOLIC BLOOD PRESSURE: 158 MMHG | DIASTOLIC BLOOD PRESSURE: 84 MMHG | WEIGHT: 142.8 LBS | HEART RATE: 104 BPM | RESPIRATION RATE: 20 BRPM | BODY MASS INDEX: 27.89 KG/M2

## 2018-01-01 VITALS
TEMPERATURE: 96.9 F | SYSTOLIC BLOOD PRESSURE: 128 MMHG | HEART RATE: 98 BPM | RESPIRATION RATE: 19 BRPM | BODY MASS INDEX: 29.41 KG/M2 | WEIGHT: 150.6 LBS | DIASTOLIC BLOOD PRESSURE: 62 MMHG

## 2018-01-01 DIAGNOSIS — Z51.81 ENCOUNTER FOR THERAPEUTIC DRUG MONITORING: ICD-10-CM

## 2018-01-01 DIAGNOSIS — J44.1 COPD EXACERBATION (H): Primary | ICD-10-CM

## 2018-01-01 DIAGNOSIS — Z71.89 ACP (ADVANCE CARE PLANNING): Chronic | ICD-10-CM

## 2018-01-01 DIAGNOSIS — K21.9 GASTROESOPHAGEAL REFLUX DISEASE, ESOPHAGITIS PRESENCE NOT SPECIFIED: ICD-10-CM

## 2018-01-01 DIAGNOSIS — H04.123 DRY EYES: Primary | ICD-10-CM

## 2018-01-01 DIAGNOSIS — I48.20 CHRONIC ATRIAL FIBRILLATION (H): ICD-10-CM

## 2018-01-01 DIAGNOSIS — J44.1 COPD EXACERBATION (H): ICD-10-CM

## 2018-01-01 DIAGNOSIS — J44.1 CHRONIC OBSTRUCTIVE PULMONARY DISEASE WITH ACUTE EXACERBATION (H): ICD-10-CM

## 2018-01-01 DIAGNOSIS — R50.9 FEVER, UNSPECIFIED FEVER CAUSE: Primary | ICD-10-CM

## 2018-01-01 DIAGNOSIS — R05.9 COUGH: ICD-10-CM

## 2018-01-01 DIAGNOSIS — I50.9 CONGESTIVE HEART FAILURE, UNSPECIFIED CONGESTIVE HEART FAILURE CHRONICITY, UNSPECIFIED CONGESTIVE HEART FAILURE TYPE: Primary | ICD-10-CM

## 2018-01-01 DIAGNOSIS — I48.91 ATRIAL FIBRILLATION, UNSPECIFIED TYPE (H): ICD-10-CM

## 2018-01-01 DIAGNOSIS — F41.9 ANXIETY DISORDER, UNSPECIFIED TYPE: ICD-10-CM

## 2018-01-01 DIAGNOSIS — I50.22 CHRONIC SYSTOLIC CONGESTIVE HEART FAILURE (H): Primary | ICD-10-CM

## 2018-01-01 DIAGNOSIS — F33.0 MILD RECURRENT MAJOR DEPRESSION (H): ICD-10-CM

## 2018-01-01 DIAGNOSIS — I10 ESSENTIAL HYPERTENSION, BENIGN: ICD-10-CM

## 2018-01-01 DIAGNOSIS — J44.1 CHRONIC OBSTRUCTIVE PULMONARY DISEASE WITH ACUTE EXACERBATION (H): Primary | ICD-10-CM

## 2018-01-01 DIAGNOSIS — N18.4 CHRONIC KIDNEY DISEASE, STAGE IV (SEVERE) (H): ICD-10-CM

## 2018-01-01 DIAGNOSIS — J96.01 ACUTE RESPIRATORY FAILURE WITH HYPOXEMIA (H): ICD-10-CM

## 2018-01-01 DIAGNOSIS — I10 ESSENTIAL HYPERTENSION: ICD-10-CM

## 2018-01-01 DIAGNOSIS — E53.8 VITAMIN B12 DEFICIENCY (NON ANEMIC): ICD-10-CM

## 2018-01-01 DIAGNOSIS — Z79.01 LONG TERM CURRENT USE OF ANTICOAGULANT THERAPY: ICD-10-CM

## 2018-01-01 DIAGNOSIS — H04.123 DRY EYES: ICD-10-CM

## 2018-01-01 LAB
INR PPP: 2.36 (ref 0.9–1.1)
INR PPP: 2.55 (ref 0.9–1.1)

## 2018-01-01 RX ORDER — HYDROMORPHONE HYDROCHLORIDE 2 MG/1
2 TABLET ORAL EVERY 6 HOURS PRN
Qty: 14 TABLET | Refills: 0
Start: 2018-01-01

## 2018-01-01 RX ORDER — POLYVINYL ALCOHOL 14 MG/ML
1-2 SOLUTION/ DROPS OPHTHALMIC
Qty: 15 ML | Refills: 98 | OUTPATIENT
Start: 2018-01-01

## 2018-01-01 RX ORDER — NYSTATIN 100000 [USP'U]/G
POWDER TOPICAL 2 TIMES DAILY
COMMUNITY

## 2018-01-01 RX ORDER — POLYVINYL ALCOHOL 14 MG/ML
1-2 SOLUTION/ DROPS OPHTHALMIC
Qty: 15 ML | Refills: 98 | Status: SHIPPED | OUTPATIENT
Start: 2018-01-01

## 2018-01-01 RX ORDER — ASPIRIN 81 MG
TABLET,CHEWABLE ORAL
Qty: 30 TABLET | Refills: 97
Start: 2018-01-01

## 2018-01-01 RX ORDER — GUAIFENESIN 600 MG/1
600 TABLET, EXTENDED RELEASE ORAL 2 TIMES DAILY PRN
Qty: 20 TABLET | Refills: 11
Start: 2018-01-01

## 2018-01-01 RX ORDER — TRAMADOL HYDROCHLORIDE 50 MG/1
50 TABLET ORAL 3 TIMES DAILY
Qty: 20 TABLET | Refills: 5
Start: 2018-01-01

## 2018-01-01 RX ORDER — AZITHROMYCIN 250 MG/1
TABLET, FILM COATED ORAL
Qty: 6 TABLET
Start: 2018-01-01 | End: 2018-01-01

## 2018-01-01 RX ORDER — MORPHINE SULFATE 30 MG/1
2.5 TABLET ORAL EVERY 12 HOURS
COMMUNITY
End: 2018-01-01

## 2018-01-01 RX ORDER — ASPIRIN 81 MG/1
81 TABLET, CHEWABLE ORAL DAILY
COMMUNITY
End: 2018-01-01

## 2018-01-01 ASSESSMENT — ACTIVITIES OF DAILY LIVING (ADL): DEPENDENT_IADLS:: CLEANING;COOKING;LAUNDRY;SHOPPING;MEAL PREPARATION;MEDICATION MANAGEMENT;TRANSPORATION

## 2018-01-04 NOTE — PROGRESS NOTES
[place] [facility]    Chief Complaint   Patient presents with     RECHECK       HPI:    Estefania Brandon is a 77 year old  (1940), who is being seen today for an episodic care visit at [facility] .  HPI information obtained from: facility chart records, facility staff, patient report and [facility] chart review.      Today's concern is:     Chronic obstructive pulmonary disease with acute exacerbation (H)  Cough     Patient has had increase in coughing with generally green colored sputum, on 1/3/18 had pink tinge and warfarin was held x1d, appears sick, lying in bed, reports feeling slightly better today, repeat CXR on 1/3/18 result no pneumothorax, hyperaeration with flattening of both diaphragms, no s/s pneumonia, afebrile.    ALLERGIES: Amoxicillin; Bacitracin; Cipro [benzyl alcohol]; Codeine; Neomycin; Plavix [clopidogrel bisulfate]; and Sulfa drugs  Past Medical, Surgical, Family and Social History reviewed and updated in [facility].    Current Outpatient Prescriptions   Medication Sig Dispense Refill     azithromycin (ZITHROMAX) 250 MG tablet Two tablets first day, then one tablet daily for four days. 6 tablet      guaiFENesin (MUCINEX) 600 MG 12 hr tablet Take 1 tablet (600 mg) by mouth 2 times daily as needed for congestion 20 tablet 11     ipratropium - albuterol 0.5 mg/2.5 mg/3 mL (DUONEB) 0.5-2.5 (3) MG/3ML neb solution Take 1 vial by nebulization every 3 hours Also BID PRN       albuterol (PROAIR HFA/PROVENTIL HFA/VENTOLIN HFA) 108 (90 BASE) MCG/ACT Inhaler Inhale 2 puffs into the lungs 6 times daily PRNF COPD 1 Inhaler PRN     Alum hydroxide-Mag carbonate (GAVISCON EXTRA STRENGTH) 160-105 MG CHEW Take 1 chew tab by mouth 3 times daily       triamcinolone (KENALOG) 0.1 % cream Apply topically 2 times daily as needed for irritation       budesonide (PULMICORT) 0.25 MG/2ML neb solution Take 2 mLs (0.25 mg) by nebulization 2 times daily 2 Box 98     predniSONE (DELTASONE) 10 MG  tablet Take 1 tablet (10 mg) by mouth daily 31 tablet PRN     Trolamine Salicylate (ASPERCREME) 10 % LOTN Externally apply topically 3 times daily       furosemide (LASIX) 20 MG tablet Take 1 tablet (20 mg) by mouth daily 31 tablet PRN     Calcium Carb-Cholecalciferol (CALCIUM 600/VITAMIN D3) 600-800 MG-UNIT TABS Take 1 tablet by mouth 2 times daily 62 tablet PRN     citalopram (CELEXA) 20 MG tablet Take 1 tablet (20 mg) by mouth daily 31 tablet PRN     gabapentin (NEURONTIN) 300 MG capsule Take 1 capsule (300 mg) by mouth 2 times daily 62 capsule PRN     loperamide (IMODIUM) 2 MG capsule Take 1 capsule (2 mg) by mouth daily 31 capsule PRN     simvastatin (ZOCOR) 20 MG tablet Take 1 tablet (20 mg) by mouth daily 31 tablet PRN     omeprazole (PRILOSEC) 20 MG CR capsule Take 1 capsule (20 mg) by mouth 2 times daily 62 capsule PRN     Potassium Chloride ER 20 MEQ TBCR Take 1 tablet (20 mEq) by mouth daily 31 tablet PRN     cyanocobalamin 1000 MCG TABS Take 1,000 mcg by mouth daily 31 tablet PRN     TRAMADOL HCL PO Take 25 mg by mouth 3 times daily as needed        ALPRAZOLAM PO Take 0.25 mg by mouth 3 times daily Also give 0.25mg po q4h prn       Warfarin Sodium (COUMADIN PO) Take by mouth daily Take as directed per INR       fluticasone (FLONASE) 50 MCG/ACT spray Spray 2 sprays into both nostrils daily        Acetaminophen (TYLENOL PO) Take 1,000 mg by mouth every 6 hours as needed for mild pain or fever       NITROGLYCERIN SL Place 0.4 mg under the tongue every 5 minutes as needed for chest pain       carboxymethylcellulose (REFRESH PLUS) 0.5 % SOLN ophthalmic solution Place 1-2 drops into both eyes every 4 hours as needed for dry eyes       sodium chloride (OCEAN) 0.65 % nasal spray Spray 1 spray into both nostrils as needed for congestion       Medications reviewed:  Medications reconciled to facility chart and changes were made to reflect current medications as identified as above med list. Below are the changes  that were made:   Medications stopped since last EPIC medication reconciliation:   There are no discontinued medications.    Medications started since last [facility] medication reconciliation:  No orders of the defined types were placed in this encounter.      REVIEW OF SYSTEMS:  4 point ROS including Respiratory, CV, GI and , other than that noted in the HPI,  is negative    Physical Exam:  /80  Pulse 100  Temp 98  F (36.7  C)  Resp 20  SpO2 96%  GENERAL APPEARANCE:  oriented, appears ill  ENT:  Mouth and posterior oropharynx normal, moist mucous membranes  RESP:  diminished breath sounds vesicular, crackles bronchial  CV:  regular rate and rhythm, no murmur, rub, or gallop, no edema  ABDOMEN:  bowel sounds normal  M/S:   Gait and station normal  SKIN:  Inspection of skin and subcutaneous tissue baseline  NEURO:   Examination of sensation by touch normal  PSYCH:  oriented X 3, affect and mood normal    Recent Labs:      CBC RESULTS:   Recent Labs   Lab Test 10/27/17 04/12/17   WBC  6.4  6.0   RBC  3.73  3.77*   HGB  12.1  12.2   HCT  38.4  38.0   MCV  103*  101*   MCH  32.4  32.4   MCHC  31.5*  32.1   RDW  12.4  12.6   PLT  156  165       Last Basic Metabolic Panel:  Recent Labs   Lab Test 10/27/17 04/12/17   NA  140  142   POTASSIUM  4.2  4.1   CHLORIDE  99  98   JUANITA  9.1  9.1   CO2  35*  34*   BUN  16  20   CR  0.96  0.86   GLC  84  88       Liver Function Studies -   Recent Labs   Lab Test  11/12/10   1053  03/17/10   1219   AST  30  24   ALT  38  25       TSH   Date Value Ref Range Status   04/12/2017 1.05 0.30 - 5.00 uIU/mL Final   11/12/2010 1.05 0.4 - 5.0 mU/L Final       Lab Results   Component Value Date    INR 2.22 11/16/2017    INR 2.45 11/01/2017    INR 2.09 10/27/2017    INR 2.27 10/18/2017    INR 2.50 09/20/2017    INR 2.26 08/02/2017     Lab Results   Component Value Date    A1C 5.8 11/12/2010           Assessment/Plan:  Chronic obstructive pulmonary disease with acute exacerbation  (H)  Cough  -patient having COPD exacerbation, slight improvement over past 24 hours  -continue O2  -started mucinex 600mg BID PRN for cough  -started Zpak today along with increase in prednisone to 20mg Qd x5d  -of note, held warfarin dose on 1/3 due to pink tinged sputum  -INR recheck scheduled for 1/10/18        Electronically signed by  IAN Maloney [facility]

## 2018-01-11 NOTE — PROGRESS NOTES
"[place] [facility]    Chief Complaint   Patient presents with     INR RESULTS     RECHECK       HPI:    Estefania Brandon is a 77 year old  (1940), who is being seen today for an episodic care visit at [facility] .  HPI information obtained from: facility chart records, facility staff, patient report and [facility] chart review.    Today's concern is:  Fever, unspecified fever cause  Temp today of 99.9    Cough  Chest X ray negative for Pneumonia. Productive cough with blood speckled light tan phlegm. Today cough is \"better\" and \"voice is better\". Thick green mucus. Bronchial crackles, vesicular diminishes.     COPD exacerbation (H)    Chronic atrial fibrillation (H)  Encounter for therapeutic drug monitoring  Long term current use of anticoagulant therapy  Current Coumadin dosing 2mg po T/TH; 4mg po ROW. INR today 2.55    Patient seen today on request for follow up, daughter Ludivina Burris also present, patient states wanting more prednisone to clear up COPD exacerbation, daughter agrees, of note recently completed course of Zpak and prednisone burst although symptoms have not completely resolved, today has moderate diaphragmic pain due to coughing, moderate anxiety, appears stable, sitting at table, no SOB at rest.      ALLERGIES: Amoxicillin; Bacitracin; Cipro [benzyl alcohol]; Codeine; Neomycin; Plavix [clopidogrel bisulfate]; and Sulfa drugs  Past Medical, Surgical, Family and Social History reviewed and updated in [facility].    Current Outpatient Prescriptions   Medication Sig Dispense Refill     PREDNISONE PO Take 20 mg by mouth daily       guaiFENesin (MUCINEX) 600 MG 12 hr tablet Take 1 tablet (600 mg) by mouth 2 times daily as needed for congestion 20 tablet 11     ipratropium - albuterol 0.5 mg/2.5 mg/3 mL (DUONEB) 0.5-2.5 (3) MG/3ML neb solution Take 1 vial by nebulization every 3 hours Also BID PRN       albuterol (PROAIR HFA/PROVENTIL HFA/VENTOLIN HFA) 108 (90 BASE) MCG/ACT " Inhaler Inhale 2 puffs into the lungs 6 times daily PRNF COPD 1 Inhaler PRN     Alum hydroxide-Mag carbonate (GAVISCON EXTRA STRENGTH) 160-105 MG CHEW Take 1 chew tab by mouth 3 times daily       triamcinolone (KENALOG) 0.1 % cream Apply topically 2 times daily as needed for irritation       budesonide (PULMICORT) 0.25 MG/2ML neb solution Take 2 mLs (0.25 mg) by nebulization 2 times daily 2 Box 98     predniSONE (DELTASONE) 10 MG tablet Take 1 tablet (10 mg) by mouth daily 31 tablet PRN     Trolamine Salicylate (ASPERCREME) 10 % LOTN Externally apply topically 3 times daily       furosemide (LASIX) 20 MG tablet Take 1 tablet (20 mg) by mouth daily 31 tablet PRN     Calcium Carb-Cholecalciferol (CALCIUM 600/VITAMIN D3) 600-800 MG-UNIT TABS Take 1 tablet by mouth 2 times daily 62 tablet PRN     citalopram (CELEXA) 20 MG tablet Take 1 tablet (20 mg) by mouth daily 31 tablet PRN     loperamide (IMODIUM) 2 MG capsule Take 1 capsule (2 mg) by mouth daily 31 capsule PRN     simvastatin (ZOCOR) 20 MG tablet Take 1 tablet (20 mg) by mouth daily 31 tablet PRN     omeprazole (PRILOSEC) 20 MG CR capsule Take 1 capsule (20 mg) by mouth 2 times daily 62 capsule PRN     Potassium Chloride ER 20 MEQ TBCR Take 1 tablet (20 mEq) by mouth daily 31 tablet PRN     cyanocobalamin 1000 MCG TABS Take 1,000 mcg by mouth daily 31 tablet PRN     TRAMADOL HCL PO Take 25 mg by mouth 3 times daily as needed        ALPRAZOLAM PO Take 0.25 mg by mouth 3 times daily Also give 0.25mg po q4h prn       Warfarin Sodium (COUMADIN PO) Take by mouth daily Take as directed per INR       fluticasone (FLONASE) 50 MCG/ACT spray Spray 2 sprays into both nostrils daily        Acetaminophen (TYLENOL PO) Take 1,000 mg by mouth every 6 hours as needed for mild pain or fever       NITROGLYCERIN SL Place 0.4 mg under the tongue every 5 minutes as needed for chest pain       carboxymethylcellulose (REFRESH PLUS) 0.5 % SOLN ophthalmic solution Place 1-2 drops into  both eyes every 4 hours as needed for dry eyes       sodium chloride (OCEAN) 0.65 % nasal spray Spray 1 spray into both nostrils as needed for congestion       Medications reviewed:  Medications reconciled to facility chart and changes were made to reflect current medications as identified as above med list. Below are the changes that were made:   Medications stopped since last EPIC medication reconciliation:   There are no discontinued medications.    Medications started since last [facility] medication reconciliation:  No orders of the defined types were placed in this encounter.      REVIEW OF SYSTEMS:  4 point ROS including Respiratory, CV, GI and , other than that noted in the HPI,  is negative    Physical Exam:  /74  Pulse 98  Temp 99.9  F (37.7  C)  Resp 22  Wt 150 lb 9.6 oz (68.3 kg)  BMI 29.41 kg/m2  GENERAL APPEARANCE:  Alert, in no distress  ENT:  Mouth and posterior oropharynx normal, moist mucous membranes, normal hearing acuity  RESP:  lungs clear to auscultation , no respiratory distress  CV:  regular rate and rhythm, no murmur, rub, or gallop, no edema  ABDOMEN:  no guarding or rebound, bowel sounds normal  M/S:   Gait and station abnormal weak, requires walker/WC  SKIN:  Inspection of skin and subcutaneous tissue baseline  NEURO:   Examination of sensation by touch normal  PSYCH:  oriented X 3, affect and mood normal    Recent Labs:     CBC RESULTS:   Recent Labs   Lab Test 10/27/17 04/12/17   WBC  6.4  6.0   RBC  3.73  3.77*   HGB  12.1  12.2   HCT  38.4  38.0   MCV  103*  101*   MCH  32.4  32.4   MCHC  31.5*  32.1   RDW  12.4  12.6   PLT  156  165       Last Basic Metabolic Panel:  Recent Labs   Lab Test 10/27/17 04/12/17   NA  140  142   POTASSIUM  4.2  4.1   CHLORIDE  99  98   JUANITA  9.1  9.1   CO2  35*  34*   BUN  16  20   CR  0.96  0.86   GLC  84  88       Liver Function Studies -   Recent Labs   Lab Test  11/12/10   1053  03/17/10   1219   AST  30  24   ALT  38  25       TSH   Date Value  Ref Range Status   04/12/2017 1.05 0.30 - 5.00 uIU/mL Final   11/12/2010 1.05 0.4 - 5.0 mU/L Final     Lab Results   Component Value Date    INR 2.22 11/16/2017    INR 2.45 11/01/2017    INR 2.09 10/27/2017    INR 2.27 10/18/2017    INR 2.50 09/20/2017       Lab Results   Component Value Date    A1C 5.8 11/12/2010         Assessment/Plan:  Fever, unspecified fever cause  Cough  COPD exacerbation (H)  -recently completed Zpak and prednisone burst  -will add prednisone 10mg Qd x7d to provide pulmonary relief  -continue other meds as scheduled  -of note, patient agrees to trial D/C gabapentin, may no longer be indicated    Chronic atrial fibrillation (H)  Encounter for therapeutic drug monitoring  Long term current use of anticoagulant therapy  -INR today 2.55  -start warfarin 2mg MWF, 4mg ROW  -recheck INR on Wed 2/7/18    Family communications; daughter Ludivina present today, agrees with plan above, no additional concerns.    Electronically signed by  IAN Maloney CNP  [place] [facility]

## 2018-01-22 NOTE — TELEPHONE ENCOUNTER
Estefania discharged from [facility] 1/21 and admitted to [facility] for short term rrehab.  You are receiving this message because this member is on the state government program [employer] [employer] [facility] or [facility] [facility].  are required to notify the primary care physician with all admissions and discharges from hospitals and nursing homes.  If you have further questions please feel free to contact me.    Chantelle Cardona RN PHN  [facility] Case Management  [phone]

## 2018-01-26 NOTE — PROGRESS NOTES
1/26/18 MILTON called Elizabeth MARMOLEJO to see how client is doing. [facility].  Chantelle Cardona RN BA N  [facility] Case Management  [phone]

## 2018-02-07 NOTE — PROGRESS NOTES
2/7/18 MILTON called Elizabeth Marquez for follow up. [facility].  Chantelle Cardona RN BA N  [facility] Case Management  [phone]

## 2018-02-12 NOTE — PROGRESS NOTES
2/12/18 MILTON called Elizabeth MARMOLEJO for follow up. [facility].  Chantelle Cardona RN BA PHN  [facility] Case Management  [phone]

## 2018-02-19 NOTE — PROGRESS NOTES
2/19/18 MILTON called Elizabeth Soto for follow up. [facility].  Chantelle Cardona RN BA PHN  [facility] Case Management  [phone]

## 2018-02-23 NOTE — PROGRESS NOTES
2/23/18 MILTON was notified member had been admitted to [facility] with chest pain 2/21/18. MILTON has not received any phone calls back from the several messages left with the FRANCIE Soto at [facility]. MILTON does not know if she had been discharged back to her AL or was at [facility] before the admission.  MILTON spoke with the SW at [facility] who is covering for Mackenzie Banegas today. It appears she may have been an admit from [facility]. Plans are for discharge Monday 2/26 to her AL [facility] with [facility].  Chantelle Cardona RN BA N  [facility] Case Management  [phone]

## 2018-02-23 NOTE — TELEPHONE ENCOUNTER
Estefania was admitted to [facility] 2/21/18 with chest pain.  You are receiving this message because this member is on the state government program [facility] [facility] or [facility] [facility].  are required to notify the primary care physician with all admissions and discharges from hospitals and nursing homes.  If you have further questions please feel free to contact me.    Chantelle Cardona RN N  [facility] Case Management  [phone]

## 2018-02-28 NOTE — PROGRESS NOTES
2/28/18 MILTON was informed that Estefania discharged form [facility] to [facility] on 2/26/18.   MILTON called Estefania and she states she is doing fine. She feels a little better today. She has elected [facility] at this time.  MILTON also spoke with Bre the nurse at [facility]. Bre states they have not had contact with the hospice nurse yet they just saw her yesterday. They have taken her off many medications and so they have less services at this time. Member is still transferring on her own. Bre will keep MILTON informed on how she is doing over the next several weeks. If they feel she needs more services they will contact MILTON to come and do an early reassessment. Otherwise MILTON will see her in April.  MILTON faxed the hsopice election form to [facility] and notified PCP of her return to the AL. [facility] sees member at the AL.  Chantelle Cardona RN BA PHN  [facility] Case Management  [phone]

## 2018-02-28 NOTE — TELEPHONE ENCOUNTER
Estefania returned to [facility] 2/26/18 after her [facility] admission. She has elected [facility] at this time.  You are receiving this message because this member is on the state government program [facility] [employer] [facility] or [facility] [facility].  are required to notify the primary care physician with all admissions and discharges from hospitals and nursing homes.  If you have further questions please feel free to contact me.    Chantelle Cardona RN N  [facility] Case Management  [phone]

## 2018-03-07 NOTE — PROGRESS NOTES
[place] [facility]  PRIMARY CARE PROVIDER AND CLINIC:  Julio César Maria [address] [place] [address] / [facility] [address]  Chief Complaint   Patient presents with     Hospital F/U       HPI:    Estefania Brandon is a 77 year old  (1940),re-admitted to the [facility]  from [facility].  Hospital stay 2/21/18 through 2/26/18.  Admitted to this facility for  medical management and nursing care.      Hospital Course Per [facility] Discharge Summary 2/2/6/18:    The patient is a 77-year-old woman with oxygen-dependent COPD who presented with chest pressure, shortness of breath, orthopnea, insomnia, anxiety. She is admitted with a COPD exacerbation.     She has oxygen-dependent COPD and has been on steroids for quite some time. In the past several days, she has had insomnia and a feeling like she is going to die during her sleep. She uses BiPAP but is struggling with that. She has had increased cough with virtually no sputum production. She denies fevers, chills or sweats.         Subjective: The patient is a 77-year-old woman with oxygen-dependent COPD. She presented with extreme shortness of breath and hypoxemia.     2/22: She had a good night's sleep, but has been very short of breath this morning. She met with palliative care and is considering hospice. Her daughter is in the room with her at the time of my visit     2/23: She says she feels terrible with morning. She slept well, but had a difficult time waking up this morning. She is quite short of breath. Nonproductive cough     2/24: Difficult night with shortness of breath and anxiety. Several episodes of diarrhea and rectal urgency. She says she knows she is dying and is ready to die but she is very anxious and fearful.     2/25: No new problems reported. No complaints this morning     2/26 plan home to [place] with hospice. Discussed with patient and family.      HPI information obtained from: facility chart records, facility  "staff, patient report, [facility] chart review and Care Everywhere [facility] chart review.      Current issues are:         COPD exacerbation (H)  Acute respiratory failure with hypoxemia (H)  Chronic atrial fibrillation (H)  Essential hypertension, benign  Mild recurrent major depression (H)     Patient hospital stay at [facility] 1/16-1/21, transferred to [facility] 1/21-2/15, then re-hospitalized [facility] 2/21-2/25, returned to [facility] with [facility], today lying in bed, no s/s distress, states that \"I have contributed to her death\" and goes on to say that if I had given her more prednisone this would not have happened, blames me for COPD exacerbation and current decline in status and is wondering if she can forgive me, my response was therapeutic versus argumentative, we agree that [facility] hospice team is in charge and that they will \"take over\" therapeutics at this time, denies pain/discomfort, is getting out of bed for meals, I encouraged getting up and using electric WC as possible, no acute concerns at this time, towards end of conversation encouraged patient to seek aid of ombudsman or other support based on current thoughts as this may provide resolution, that it is my ethical and moral obligation to suggest this without regard to whom is to blame for current physical status.    CODE STATUS/ADVANCE DIRECTIVES DISCUSSION:   DNR / DNI  Patient's living condition: lives in an assisted living facility    ALLERGIES:Amoxicillin; Bacitracin; Cipro [benzyl alcohol]; Codeine; Neomycin; Plavix [clopidogrel bisulfate]; and Sulfa drugs  PAST MEDICAL HISTORY:  has a past medical history of Abnormal Pap smear; Atrial fibrillation (H) (2008); Carotid artery bruit; COPD (chronic obstructive pulmonary disease) (H) (1992); Essential hypertension, benign (1999); GERD (GASTROESOPHAGEAL REFLUX DISEASE) (11/16/2010); High cholesterol; Hypercoagulable state (H) (1993); Irritable bowel; and Osteoporoses.  PAST SURGICAL HISTORY:  " has a past surgical history that includes EARDRUM REVISION; tonsillectomy & adenoidectomy (1950s); and EXCISION SKIN OF NOSE (8-11-10).  FAMILY HISTORY: family history includes Alcohol/Drug in her son; Alzheimer Disease in her brother; C.A.D. in her brother; CANCER in her brother and father; CEREBROVASCULAR DISEASE in her sister; Cancer - colorectal in her father; Cardiovascular in her mother; Connective Tissue Disorder in her daughter and son; Depression in her mother; Genitourinary Problems in her brother; Gynecology in her mother; HEART DISEASE in her brother; Hypertension in her brother, brother, mother, sister, sister, sister, and son; Lipids in her brother, sister, sister, and sister; Psychotic Disorder in her brother; Respiratory in her sister.  SOCIAL HISTORY:  reports that she has quit smoking. Her smoking use included Cigarettes. She has a 70.00 pack-year smoking history. She does not have any smokeless tobacco history on file. She reports that she does not drink alcohol or use illicit drugs.    Post Discharge Medication Reconciliation Status: discharge medications reconciled, continue medications without change.  Current Outpatient Prescriptions   Medication Sig Dispense Refill     Alum hydroxide-Mag carbonate (ACID GONE) 160-105 MG CHEW Take 1 tablet by mouth 3 times daily       aspirin (ASPIRIN 81) 81 MG chewable tablet Take 81 mg by mouth daily       bismuth subsalicylate (BISMATROL MAXIMUM STRENGTH) 525 MG/15ML SUSP Take 7.5 mLs by mouth every 6 hours as needed       MELATONIN PO Take 10 mg by mouth At Bedtime       Mirtazapine (REMERON PO) Take 7.5 mg by mouth At Bedtime       morphine 2.5 MG solu-tab Take 2.5 mg by mouth every 12 hours Also give 5mg po q4h prn       nystatin (MYCOSTATIN) 207421 UNIT/GM POWD Apply topically 2 times daily       PANTOPRAZOLE SODIUM PO Take 20 mg by mouth daily       PREDNISONE PO Take 20 mg by mouth daily       guaiFENesin (MUCINEX) 600 MG 12 hr tablet Take 1 tablet  (600 mg) by mouth 2 times daily as needed for congestion 20 tablet 11     ipratropium - albuterol 0.5 mg/2.5 mg/3 mL (DUONEB) 0.5-2.5 (3) MG/3ML neb solution Take 1 vial by nebulization every 4 hours as needed Also BID PRN        albuterol (PROAIR HFA/PROVENTIL HFA/VENTOLIN HFA) 108 (90 BASE) MCG/ACT Inhaler Inhale 2 puffs into the lungs 6 times daily PRNF COPD 1 Inhaler PRN     budesonide (PULMICORT) 0.25 MG/2ML neb solution Take 2 mLs (0.25 mg) by nebulization 2 times daily 2 Box 98     Trolamine Salicylate (ASPERCREME) 10 % LOTN Externally apply topically 3 times daily       furosemide (LASIX) 20 MG tablet Take 1 tablet (20 mg) by mouth daily 31 tablet PRN     citalopram (CELEXA) 20 MG tablet Take 1 tablet (20 mg) by mouth daily 31 tablet PRN     loperamide (IMODIUM) 2 MG capsule Take 1 capsule (2 mg) by mouth daily 31 capsule PRN     Potassium Chloride ER 20 MEQ TBCR Take 1 tablet (20 mEq) by mouth daily 31 tablet PRN     cyanocobalamin 1000 MCG TABS Take 1,000 mcg by mouth daily 31 tablet PRN     TRAMADOL HCL PO Take 25 mg by mouth 3 times daily Also give 50mg po q6h prn       ALPRAZOLAM PO Take 0.25 mg by mouth 3 times daily Also give 0.25mg po q4h prn       Warfarin Sodium (COUMADIN PO) Take by mouth daily Take as directed per INR       fluticasone (FLONASE) 50 MCG/ACT spray Spray 2 sprays into both nostrils daily        Acetaminophen (TYLENOL PO) Take 1,000 mg by mouth every 6 hours as needed for mild pain or fever       NITROGLYCERIN SL Place 0.4 mg under the tongue every 5 minutes as needed for chest pain       carboxymethylcellulose (REFRESH PLUS) 0.5 % SOLN ophthalmic solution Place 1-2 drops into both eyes every 4 hours as needed for dry eyes       sodium chloride (OCEAN) 0.65 % nasal spray Spray 1 spray into both nostrils every 2 hours as needed for congestion        [START ON 3/9/2018] PREDNISONE PO Take 10 mg by mouth daily         ROS:  4 point ROS including Respiratory, CV, GI and , other than  that noted in the HPI,  is negative    Exam:  /62  Pulse 98  Temp 96.9  F (36.1  C)  Resp 19  Wt 150 lb 9.6 oz (68.3 kg)  BMI 29.41 kg/m2  GENERAL APPEARANCE:  Alert, in no distress, appears healthy  Exam deferred due to potential sensitivity of issues as stated in HPI.      Lab/Diagnostic data:    Labs per [facility] Discharge Summary 2/26/18:    02/26/18  0654 02/25/18  0631 02/24/18  0631   02/22/18  0715 02/21/18  1220   WBC -- -- -- -- -- 9.4   HGB -- -- -- -- -- 13.7   PLT -- -- -- -- 190 206   INR 2.2 H 2.6 H 3.3 H < > 2.3 H 2.7 H   < > = values in this interval not displayed.       Chemistry     Recent Labs      02/22/18  0715 02/21/18  1220 02/03/18  0835   SODIUM 144 141 145   POTASSIUM 4.4 4.1 3.9   [id number] 28 31 30   BUN 14 12 19   CREATININE 0.94 1.08 0.96   MAGNESIUM 2.4 -- --   TROPONINI -- 0.010 --              CBC RESULTS:   Recent Labs   Lab Test 10/27/17 04/12/17   WBC  6.4  6.0   RBC  3.73  3.77*   HGB  12.1  12.2   HCT  38.4  38.0   MCV  103*  101*   MCH  32.4  32.4   MCHC  31.5*  32.1   RDW  12.4  12.6   PLT  156  165       Last Basic Metabolic Panel:  Recent Labs   Lab Test 10/27/17 04/12/17   NA  140  142   POTASSIUM  4.2  4.1   CHLORIDE  99  98   JUANTIA  9.1  9.1   CO2  35*  34*   BUN  16  20   CR  0.96  0.86   GLC  84  88       Liver Function Studies -   Recent Labs   Lab Test  11/12/10   1053  03/17/10   1219   AST  30  24   ALT  38  25       TSH   Date Value Ref Range Status   04/12/2017 1.05 0.30 - 5.00 uIU/mL Final   11/12/2010 1.05 0.4 - 5.0 mU/L Final         Lab Results   Component Value Date    A1C 5.8 11/12/2010       ASSESSMENT/PLAN:  COPD exacerbation (H)  Acute respiratory failure with hypoxemia (H)  -currently stable, no SOB at rest, using O2  -continue duoneb, furosemide, budesonide, fluticasone, mucinex, prednisone, nasal spray as prescribed  -follow POC as indicated; no changes    Chronic atrial fibrillation (H)  Essential hypertension, benign  -SBP's WNL,  "generally in the 120-130 range  -continue furosemide 20mg Qd to control fluid  -continue ASA 81mg for afib    Mild recurrent major depression (H)  -concerned with current though of \"dying\"  -continue alprazolam 0.25mg TID, mirtazapine 7.5mg Qhs, citalopram 20mg Qd    Hospice Patient  -followed by [facility]; appreciate their support  -continue current med regimen including morphine BID and PRN (refilled today)  -consider DC of tramadol in near future with increased morphine use      Electronically signed by:  IAN Maloney [facility]                  "

## 2018-03-12 PROBLEM — Z71.89 ACP (ADVANCE CARE PLANNING): Status: ACTIVE | Noted: 2017-05-12

## 2018-03-12 PROBLEM — Z71.89 ACP (ADVANCE CARE PLANNING): Chronic | Status: ACTIVE | Noted: 2017-05-12

## 2018-03-30 NOTE — PROGRESS NOTES
3/30/18 MILTON scheduled a home visit with Estefania for 4/3. MILTON also spoke with Kacy nurse at [facility] letting them know CM will be seeing Estefania and if they need any CL tool changes with care to let CM know. MILTON will stop by the nurses station on 4/3.   Chantelle Cardona RN BA PHN  [facility] Case Management  [phone]

## 2018-04-03 NOTE — PROGRESS NOTES
4/3/18  faxed the CL tool to Anna FONG and sent to [employer] to download to [place] [employer] and send auth to [facility].  Chantelle Cardona RN BA N  [facility] Case Management  [phone]

## 2018-04-03 NOTE — PROGRESS NOTES
[facility] Care Coordination Contact    [facility] Home Visit Assessment     Home visit for Health Risk Assessment with Estefania Brandon completed on April 3, 2018       Current living arrangement:: I live in assisted living     Assessment completed with:: Patient    Current Care Plan  Member currently receiving the following home care services:     Member currently receiving the following community resources: County Worker, Transportation Services      Medication Review  Medication reconciliation completed in Epic: Yes  Medication set-up & administration: RN sets up  weekly.  Assisting Living staff administers medications.  Medication understanding concerns (by member, family or CC): concerns (by member, family or CC): No    Mental/Behavioral Health   Depression Screening: See PHQ assessment flowsheet.   Mental health DX:: Yes   Mental health DX how managed:: Medication  No current MH services-will place referral for Medication    Falls Assessment:   Fallen 2 or more times in the past year?: No   Any fall with injury in the past year?: No    ADL/IADL Dependencies:   Dependent ADLs:: Ambulation-walker, Grooming, Bathing  Dependent IADLs:: Cleaning, Cooking, Laundry, Shopping, Meal Preparation, Medication Management, Transporation    [facility] sponsored benefits: Shared information re: Silver Sneakers/gym memberships, ASA, Calcium +D.    PCA Assessment completed at visit: No     Elderly Waiver Eligibility: Yes-will continue on EW    Care Plan & Recommendations: see care plan    See [facility]CC for detailed assessment information.    Follow-Up Plan: Member informed of future contact, plan to f/u with member with a 6 month telephone assessment.  Contact information shared with member and family, encouraged member to call with any questions or concerns at any time.    [facility] continuum providers: Please refer to Health Care Home on the [facility] Problem List to view this patient's [facility]  Plan Summary.    Member asked CM to review letters she received from [facility] for denial pf payment for services. She received 18 of these letters in 1 day. CM opened 2 of these with members permission. CM called [facility] regarding the denial of payment. Specific services were not listed. [facility] states they have on record member has another primary insurance through [email or web address]. Member denies having this insurance. [facility] states member must call and dispute this and ask for a review. Member also asked CM to contact her daughter Brenda regarding this issue.  CM called Brenda and gave her this information and the number to call [facility] to have this corrected.    Estefania also asked about a waiver obligation. Anna FONG is telling her she owes money but is also telling her she should have gotten a bill for this. She has never received a bill for anything.  CM explained what a waiver obligation is. MILTON is not aware of one but does not have any [facility] information to call about this. Estefania states she is sure she has one but have never paid anyone or gotten a bill for this. A Ezra at [facility] is telling her she owes them money. MILTON asked if Estefania wants  to talk with this Ezra and she said no. She will talk to him herself. CM also discussed a possible WO with daughter Brenda. She will contact [facility]. CM had asked member if she had her county paperwork somewhere so CM can see who her financial worker is but she did not have the paperwork anywhere handy at the time of the visit.    Chantelle Cardona RN BA N  [facility] Case Management  [phone]

## 2018-04-10 NOTE — PROGRESS NOTES
4/10/18 CM mailed members CL tool to member along with her care plan.  Chantelle Cardona RN BA N  [facility] Case Management  [phone]

## 2018-05-10 NOTE — PROGRESS NOTES
"[facility]  Chief Complaint   Patient presents with     Annual Comprehensive Nursing Home       [facility] Record Number:  [id number]    HPI:    Estefania Brandon is a 77 year old  (1940), who is being seen today for an annual comprehensive visit at [facility] .      HPI information obtained from: facility chart records, facility staff, patient report and [facility] chart review.        Today's concerns are:  Congestive heart failure, unspecified congestive heart failure chronicity, unspecified congestive heart failure type (H)  Chronic obstructive pulmonary disease with acute exacerbation (H)  Status weaker and requires increased time to accomplish ADL's, does rest when needed, sleeping more, attempts to limit visits to <1 hour due to stimuli causing her to \"wear out\" easily.    Essential hypertension  BP goals are  <140/90 mm Hg.This is higher than ACC and AHA recommendations due to risk of dizziness and falls. Patient is stable with current plan of care and routine assessment.  BP readings range:  158/84  149/65  134/65  142/75  133/74  143/69  105/61  116/67      Anxiety disorder, unspecified type  Current regimen Alprazolam 0.25mg po q4h prn and scheduled QID.     Atrial fibrillation, unspecified type (H)  HR .     Gastroesophageal reflux disease, esophagitis presence not specified  Current regimen Pantoprazole 20mg po daily.     Chronic kidney disease, stage IV (severe) (H)  Most recent creats have been in general norm.      ALLERGIES: Amoxicillin; Bacitracin; Cipro [benzyl alcohol]; Codeine; Neomycin; Plavix [clopidogrel bisulfate]; and Sulfa drugs  PROBLEM LIST:  Patient Active Problem List   Diagnosis     Atrial fibrillation (H)     Hypercoagulable state (H)     COPD (chronic obstructive pulmonary disease) (H)     Essential hypertension, benign     Skin cancer of nose     Chronic suppurative otitis media     Hypercholesteremia     Vitamin B12 deficiency disease     " HYPERLIPIDEMIA LDL GOAL <130     Adjustment disorder with anxiety     Mild recurrent major depression (H)     GERD (gastroesophageal reflux disease)     Perforation of tympanic membrane     Impaired fasting glucose     Congestive heart failure, unspecified congestive heart failure chronicity, unspecified congestive heart failure type (H)     Anxiety disorder, unspecified type     Nasal vestibulitis     Dry mouth     Myoclonus     Atrial fibrillation, unspecified type (H)     Essential hypertension     Vitamin B12 deficiency     Gastroesophageal reflux disease, esophagitis presence not specified     Cleveland's esophagus with dysplasia     Chronic kidney disease, stage IV (severe) (H)     C. difficile colitis     Hyperglycemia     Thoracic compression fracture, sequela     Visual hallucinations     Cataract     Floaters, unspecified laterality     Encounter for therapeutic drug monitoring     Long term current use of anticoagulant therapy     Fracture of rib     Health Care Home     ACP (advance care planning)     PAST MEDICAL HISTORY:  has a past medical history of Abnormal Pap smear; Atrial fibrillation (H) (2008); Carotid artery bruit; COPD (chronic obstructive pulmonary disease) (H) (1992); Essential hypertension, benign (1999); GERD (GASTROESOPHAGEAL REFLUX DISEASE) (11/16/2010); High cholesterol; Hypercoagulable state (H) (1993); Irritable bowel; and Osteoporoses.  PAST SURGICAL HISTORY:  has a past surgical history that includes EARDRUM REVISION; tonsillectomy & adenoidectomy (1950s); and EXCISION SKIN OF NOSE (8-11-10).  FAMILY HISTORY: family history includes Alcohol/Drug in her son; Alzheimer Disease in her brother; C.A.D. in her brother; CANCER in her brother and father; CEREBROVASCULAR DISEASE in her sister; Cancer - colorectal in her father; Cardiovascular in her mother; Connective Tissue Disorder in her daughter and son; Depression in her mother; Genitourinary Problems in her brother; Gynecology in her  mother; HEART DISEASE in her brother; Hypertension in her brother, brother, mother, sister, sister, sister, and son; Lipids in her brother, sister, sister, and sister; Psychotic Disorder in her brother; Respiratory in her sister.  SOCIAL HISTORY:  reports that she has quit smoking. Her smoking use included Cigarettes. She has a 70.00 pack-year smoking history. She does not have any smokeless tobacco history on file. She reports that she does not drink alcohol or use illicit drugs.  IMMUNIZATIONS:  Most Recent Immunizations   Administered Date(s) Administered     Influenza (H1N1) 01/08/2010     Influenza (High Dose) 3 valent vaccine 10/04/2017     Influenza (IIV3) PF 10/04/2017     Pneumo Conj 13-V (2010&after) 08/09/2016     Pneumococcal 23 valent 02/28/2012     TD (ADULT, 7+) 07/28/2003     TDAP Vaccine (Adacel) 08/14/2012     Above immunizations pulled from [facility] [facility]. [facility] and facility records also reconciled. Outstanding information sent to  to update [facility] [employer].  Future immunizations needed:  yearly influenza per facility protocol  MEDICATIONS:  Current Outpatient Prescriptions   Medication Sig Dispense Refill     Acetaminophen (TYLENOL PO) Take 1,000 mg by mouth every 6 hours as needed for mild pain or fever       albuterol (PROAIR HFA/PROVENTIL HFA/VENTOLIN HFA) 108 (90 BASE) MCG/ACT Inhaler Inhale 2 puffs into the lungs 6 times daily PRNF COPD 1 Inhaler PRN     ALPRAZOLAM PO Take 0.25 mg by mouth 4 times daily Also give 0.25mg po q4h prn       Alum hydroxide-Mag carbonate (ACID GONE) 160-105 MG CHEW Take 1 tablet by mouth 3 times daily       aspirin (ASPIRIN 81) 81 MG chewable tablet Take 81 mg by mouth daily       bismuth subsalicylate (BISMATROL MAXIMUM STRENGTH) 525 MG/15ML SUSP Take 7.5 mLs by mouth every 6 hours as needed       budesonide (PULMICORT) 0.25 MG/2ML neb solution Take 2 mLs (0.25 mg) by nebulization 2 times daily 2 Box 98     carboxymethylcellulose (REFRESH PLUS) 0.5 %  SOLN ophthalmic solution Place 1-2 drops into both eyes every 4 hours as needed for dry eyes       citalopram (CELEXA) 20 MG tablet Take 1 tablet (20 mg) by mouth daily 31 tablet PRN     cyanocobalamin 1000 MCG TABS Take 1,000 mcg by mouth daily 31 tablet PRN     fluticasone (FLONASE) 50 MCG/ACT spray Spray 2 sprays into both nostrils daily        furosemide (LASIX) 20 MG tablet Take 1 tablet (20 mg) by mouth daily 31 tablet PRN     guaiFENesin (MUCINEX) 600 MG 12 hr tablet Take 1 tablet (600 mg) by mouth 2 times daily as needed for congestion 20 tablet 11     HYDROmorphone (DILAUDID) 2 MG tablet Take 1 tablet (2 mg) by mouth every 6 hours as needed 14 tablet 0     ipratropium - albuterol 0.5 mg/2.5 mg/3 mL (DUONEB) 0.5-2.5 (3) MG/3ML neb solution Take 1 vial by nebulization every 4 hours Also BID PRN        loperamide (IMODIUM) 2 MG capsule Take 1 capsule (2 mg) by mouth daily 31 capsule PRN     MELATONIN PO Take 10 mg by mouth At Bedtime       Mirtazapine (REMERON PO) Take 7.5 mg by mouth At Bedtime       NITROGLYCERIN SL Place 0.4 mg under the tongue every 5 minutes as needed for chest pain       nystatin (MYCOSTATIN) 273500 UNIT/GM POWD Apply topically 2 times daily       PANTOPRAZOLE SODIUM PO Take 20 mg by mouth daily       Potassium Chloride ER 20 MEQ TBCR Take 1 tablet (20 mEq) by mouth daily 31 tablet PRN     PREDNISONE PO Take 20 mg by mouth daily        sodium chloride (OCEAN) 0.65 % nasal spray Spray 1 spray into both nostrils every 2 hours as needed for congestion        traMADol (ULTRAM) 50 MG tablet Take 1 tablet (50 mg) by mouth 3 times daily Also give 50mg po q6h prn 20 tablet 5     Trolamine Salicylate (ASPERCREME) 10 % LOTN Externally apply topically 3 times daily       Medications reviewed:  Medications reconciled to facility chart and changes were made to reflect current medications as identified as above med list. Below are the changes that were made:   Medications stopped since last EPIC  medication reconciliation:   There are no discontinued medications.    Medications started since last [facility] medication reconciliation:  No orders of the defined types were placed in this encounter.      Case Management:  I have reviewed the Assisted Living care plan, current immunizations and preventive care/cancer screening..Future cancer screening is not clinically indicated secondary to age/goals of care Patient's desire to return to the community is present, but is not able due to care needs . Current Level of Care is appropriate.      Advance Directive Discussion:    I reviewed the current advanced directives as reflected in EPIC, the POLST and the facility chart, and verified the congruency of orders. I contacted the first party patient and discussed the plan of Care.  I did review the advance directives with the resident.     Team Discussion:  I communicated with the appropriate disciplines involved with the Plan of Care:   Nursing      Patient Goal:  Patient's goal is pain control and comfort.    Information reviewed:  Medications, vital signs, orders, and nursing notes.    ROS:  4 point ROS including Respiratory, CV, GI and , other than that noted in the HPI,  is negative    Exam:  /84  Pulse 104  Temp 98  F (36.7  C)  Resp 20  Wt 142 lb 12.8 oz (64.8 kg)  BMI 27.89 kg/m2  GENERAL APPEARANCE:  in no distress, appears healthy  ENT:  Mouth and posterior oropharynx normal, moist mucous membranes  RESP:  lungs clear to auscultation , no respiratory distress  CV:  regular rate and rhythm, no murmur, rub, or gallop, no edema  ABDOMEN:  no guarding or rebound, bowel sounds normal  M/S:   Gait and station abnormal weak, requires walker, has EW also  SKIN:  Inspection of skin and subcutaneous tissue baseline, Palpation of skin and subcutaneous tissue baseline  NEURO:   Cranial nerves 2-12 are normal tested and grossly at patient's baseline, Examination of sensation by touch normal  PSYCH:  oriented to  "self/place, memory impaired      Lab/Diagnostic data:      CBC RESULTS:   Recent Labs   Lab Test 10/27/17 04/12/17   WBC  6.4  6.0   RBC  3.73  3.77*   HGB  12.1  12.2   HCT  38.4  38.0   MCV  103*  101*   MCH  32.4  32.4   MCHC  31.5*  32.1   RDW  12.4  12.6   PLT  156  165       Last Basic Metabolic Panel:  Recent Labs   Lab Test 10/27/17 04/12/17   NA  140  142   POTASSIUM  4.2  4.1   CHLORIDE  99  98   JUANITA  9.1  9.1   CO2  35*  34*   BUN  16  20   CR  0.96  0.86   GLC  84  88       Liver Function Studies -   Recent Labs   Lab Test  11/12/10   1053  03/17/10   1219   AST  30  24   ALT  38  25       TSH   Date Value Ref Range Status   04/12/2017 1.05 0.30 - 5.00 uIU/mL Final   11/12/2010 1.05 0.4 - 5.0 mU/L Final       Lab Results   Component Value Date    A1C 5.8 11/12/2010         ASSESSMENT/PLAN  Congestive heart failure, unspecified congestive heart failure chronicity, unspecified congestive heart failure type (H)  -no s/s overt heart failure, progressive age-related decline, moderate weakness/ with stimuli  -continue ASA 81mg and furosemide 20mg Qd    Chronic obstructive pulmonary disease with acute exacerbation (H)  -chronic and progressive decline  -pulmonary fields fairly clear with low-flow  -continue budesonide neb BID, fluticasone nasal Qd, furosemide 20mg Qd, duoneb Q4h and BID PRN, mucinex PRN, oxygen 2L continuous, prednisone 20mg Qd, albuterol HFA QID PRN  -of note, patient chronic pain WNL although complaint of dilaudid making \"feel goofy\", will change dilaudid to PRN and increase tramadol from 25 to 50mg TID along with PRN  -considering status, only pulmonary change would make if status worsens is to increase budesonide neb from 0.25 to 0.5mg BID    Essential hypertension  BP goals are  <140/90 mm Hg.This is higher than ACC and AHA recommendations due to risk of dizziness and falls. Patient is stable with current plan of care and routine assessment.  -continue furosemide 20mg Qd    Anxiety " disorder, unspecified type  -intermittent anxiety even with alprazolam scheduled  -will continue alprazolam 0.25mg QID and 0.25mg Q4h PRN, patient to monitor anxiety level and report increased use of PRN    Atrial fibrillation, unspecified type (H)  -patient RRR today, denies CP/palpitations  -was on warfarin, changed to ASA81; continue plan    Gastroesophageal reflux disease, esophagitis presence not specified  -denies gut complaint, appetite appropriate  -continue bismatrol PRN, gaviscon chew TID, pantoprazole 20mg Qam    Chronic kidney disease, stage IV (severe) (H)  -creat on 10/27/17 was 0.96  -dose medications renally as possible  -consider recheck BMP in 3-6 months      Electronically signed by:  IAN Maloney CNP  [facility]

## 2018-10-19 NOTE — PROGRESS NOTES
[facility] Care Coordination Contact      [facility] Six-Month Telephone Assessment    6 month telephone assessment completed on 10/19/18.    ER visits: No  Hospitalizations: No  TCU stays: No  Significant health status changes: None  Falls/Injuries: No  ADL/IADL changes: No  Changes in services: No    Caregiver Assessment follow up:  NA    Goals: See POC in chart for goal progress documentation.      Will see member in 6 months for an annual health risk assessment.   Encouraged member to call CC with any questions or concerns in the meantime.   Chantelle Cardona RN BA PHN  [facility] Case Management  [phone]

## 2018-12-20 RX ORDER — ASPIRIN 81 MG/1
81 TABLET, CHEWABLE ORAL DAILY
Qty: 36 TABLET | Status: CANCELLED | OUTPATIENT
Start: 2018-12-20

## 2019-01-02 ENCOUNTER — PATIENT OUTREACH (OUTPATIENT)
Dept: GERIATRIC MEDICINE | Facility: CLINIC | Age: 79
End: 2019-01-02

## 2022-02-17 PROBLEM — K21.9 GASTROESOPHAGEAL REFLUX DISEASE: Status: ACTIVE | Noted: 2017-04-06

## 2022-02-17 PROBLEM — Z76.89 HEALTH CARE HOME: Status: ACTIVE | Noted: 2017-05-08

## 2024-06-17 PROBLEM — Z76.89 HEALTH CARE HOME: Status: RESOLVED | Noted: 2017-05-08 | Resolved: 2024-06-17

## 2024-08-13 NOTE — PROGRESS NOTES
7/26/17 MILTON received an email from [facility] asking if CM new the progress on the pads we orderd for member. [facility] mailed a prescription copy to member to have her PCP sign but they have not received anything back.  MILTON called Estefania but no answer and no VM.  MILTON called the nurse at [facility] and they do not know anything about it. They will check with Estefania and see if she has it in her room somewhere and can have Dr Maria sign it tomorrow when he is there.  If they cannot locate it they will call MILTON back to have SHODNA send this to the nurses at [facility] instead.  MILTON emailed Marium Leiva/ SHONDA back with this information.  Chantelle Cardona RN BA N  [facility] Case Management  [phone]     Calm